# Patient Record
Sex: FEMALE | Race: WHITE | NOT HISPANIC OR LATINO | URBAN - METROPOLITAN AREA
[De-identification: names, ages, dates, MRNs, and addresses within clinical notes are randomized per-mention and may not be internally consistent; named-entity substitution may affect disease eponyms.]

---

## 2017-08-23 ENCOUNTER — OUTPATIENT (OUTPATIENT)
Dept: OUTPATIENT SERVICES | Facility: HOSPITAL | Age: 57
LOS: 1 days | Discharge: HOME | End: 2017-08-23

## 2017-08-23 DIAGNOSIS — R13.10 DYSPHAGIA, UNSPECIFIED: ICD-10-CM

## 2019-04-19 ENCOUNTER — TRANSCRIPTION ENCOUNTER (OUTPATIENT)
Age: 59
End: 2019-04-19

## 2019-07-12 ENCOUNTER — OUTPATIENT (OUTPATIENT)
Dept: OUTPATIENT SERVICES | Facility: HOSPITAL | Age: 59
LOS: 1 days | Discharge: HOME | End: 2019-07-12
Payer: COMMERCIAL

## 2019-07-12 DIAGNOSIS — R10.31 RIGHT LOWER QUADRANT PAIN: ICD-10-CM

## 2019-07-12 DIAGNOSIS — R10.2 PELVIC AND PERINEAL PAIN: ICD-10-CM

## 2019-07-12 DIAGNOSIS — E04.1 NONTOXIC SINGLE THYROID NODULE: ICD-10-CM

## 2019-07-12 PROCEDURE — 76830 TRANSVAGINAL US NON-OB: CPT | Mod: 26

## 2019-07-12 PROCEDURE — 76700 US EXAM ABDOM COMPLETE: CPT | Mod: 26

## 2019-07-12 PROCEDURE — 76856 US EXAM PELVIC COMPLETE: CPT | Mod: 26

## 2019-07-12 PROCEDURE — 76536 US EXAM OF HEAD AND NECK: CPT | Mod: 26

## 2021-01-12 ENCOUNTER — OUTPATIENT (OUTPATIENT)
Dept: OUTPATIENT SERVICES | Facility: HOSPITAL | Age: 61
LOS: 1 days | Discharge: HOME | End: 2021-01-12
Payer: COMMERCIAL

## 2021-01-12 DIAGNOSIS — E04.1 NONTOXIC SINGLE THYROID NODULE: ICD-10-CM

## 2021-01-12 PROCEDURE — 76536 US EXAM OF HEAD AND NECK: CPT | Mod: 26

## 2022-10-02 ENCOUNTER — INPATIENT (INPATIENT)
Facility: HOSPITAL | Age: 62
LOS: 1 days | Discharge: HOME | End: 2022-10-04
Attending: INTERNAL MEDICINE | Admitting: INTERNAL MEDICINE

## 2022-10-02 VITALS
DIASTOLIC BLOOD PRESSURE: 73 MMHG | TEMPERATURE: 98 F | RESPIRATION RATE: 18 BRPM | OXYGEN SATURATION: 99 % | WEIGHT: 240.08 LBS | HEART RATE: 91 BPM | SYSTOLIC BLOOD PRESSURE: 162 MMHG

## 2022-10-02 LAB
ALBUMIN SERPL ELPH-MCNC: 4.4 G/DL — SIGNIFICANT CHANGE UP (ref 3.5–5.2)
ALP SERPL-CCNC: 86 U/L — SIGNIFICANT CHANGE UP (ref 30–115)
ALT FLD-CCNC: 15 U/L — SIGNIFICANT CHANGE UP (ref 0–41)
ANION GAP SERPL CALC-SCNC: 13 MMOL/L — SIGNIFICANT CHANGE UP (ref 7–14)
APPEARANCE UR: CLEAR — SIGNIFICANT CHANGE UP
AST SERPL-CCNC: 22 U/L — SIGNIFICANT CHANGE UP (ref 0–41)
BASOPHILS # BLD AUTO: 0.03 K/UL — SIGNIFICANT CHANGE UP (ref 0–0.2)
BASOPHILS NFR BLD AUTO: 0.3 % — SIGNIFICANT CHANGE UP (ref 0–1)
BILIRUB SERPL-MCNC: 0.6 MG/DL — SIGNIFICANT CHANGE UP (ref 0.2–1.2)
BILIRUB UR-MCNC: NEGATIVE — SIGNIFICANT CHANGE UP
BUN SERPL-MCNC: 17 MG/DL — SIGNIFICANT CHANGE UP (ref 10–20)
CALCIUM SERPL-MCNC: 9.5 MG/DL — SIGNIFICANT CHANGE UP (ref 8.4–10.5)
CHLORIDE SERPL-SCNC: 101 MMOL/L — SIGNIFICANT CHANGE UP (ref 98–110)
CO2 SERPL-SCNC: 27 MMOL/L — SIGNIFICANT CHANGE UP (ref 17–32)
COLOR SPEC: SIGNIFICANT CHANGE UP
CREAT SERPL-MCNC: 0.7 MG/DL — SIGNIFICANT CHANGE UP (ref 0.7–1.5)
DIFF PNL FLD: NEGATIVE — SIGNIFICANT CHANGE UP
EGFR: 97 ML/MIN/1.73M2 — SIGNIFICANT CHANGE UP
EOSINOPHIL # BLD AUTO: 0.03 K/UL — SIGNIFICANT CHANGE UP (ref 0–0.7)
EOSINOPHIL NFR BLD AUTO: 0.3 % — SIGNIFICANT CHANGE UP (ref 0–8)
GLUCOSE SERPL-MCNC: 106 MG/DL — HIGH (ref 70–99)
GLUCOSE UR QL: NEGATIVE — SIGNIFICANT CHANGE UP
HCT VFR BLD CALC: 40.3 % — SIGNIFICANT CHANGE UP (ref 37–47)
HGB BLD-MCNC: 13.7 G/DL — SIGNIFICANT CHANGE UP (ref 12–16)
IMM GRANULOCYTES NFR BLD AUTO: 0.4 % — HIGH (ref 0.1–0.3)
KETONES UR-MCNC: NEGATIVE — SIGNIFICANT CHANGE UP
LEUKOCYTE ESTERASE UR-ACNC: NEGATIVE — SIGNIFICANT CHANGE UP
LIDOCAIN IGE QN: 806 U/L — HIGH (ref 7–60)
LYMPHOCYTES # BLD AUTO: 1.37 K/UL — SIGNIFICANT CHANGE UP (ref 1.2–3.4)
LYMPHOCYTES # BLD AUTO: 13 % — LOW (ref 20.5–51.1)
MCHC RBC-ENTMCNC: 29.4 PG — SIGNIFICANT CHANGE UP (ref 27–31)
MCHC RBC-ENTMCNC: 34 G/DL — SIGNIFICANT CHANGE UP (ref 32–37)
MCV RBC AUTO: 86.5 FL — SIGNIFICANT CHANGE UP (ref 81–99)
MONOCYTES # BLD AUTO: 0.47 K/UL — SIGNIFICANT CHANGE UP (ref 0.1–0.6)
MONOCYTES NFR BLD AUTO: 4.5 % — SIGNIFICANT CHANGE UP (ref 1.7–9.3)
NEUTROPHILS # BLD AUTO: 8.59 K/UL — HIGH (ref 1.4–6.5)
NEUTROPHILS NFR BLD AUTO: 81.5 % — HIGH (ref 42.2–75.2)
NITRITE UR-MCNC: NEGATIVE — SIGNIFICANT CHANGE UP
NRBC # BLD: 0 /100 WBCS — SIGNIFICANT CHANGE UP (ref 0–0)
PH UR: 8 — SIGNIFICANT CHANGE UP (ref 5–8)
PLATELET # BLD AUTO: 212 K/UL — SIGNIFICANT CHANGE UP (ref 130–400)
POTASSIUM SERPL-MCNC: 4.8 MMOL/L — SIGNIFICANT CHANGE UP (ref 3.5–5)
POTASSIUM SERPL-SCNC: 4.8 MMOL/L — SIGNIFICANT CHANGE UP (ref 3.5–5)
PROT SERPL-MCNC: 6.7 G/DL — SIGNIFICANT CHANGE UP (ref 6–8)
PROT UR-MCNC: SIGNIFICANT CHANGE UP
RBC # BLD: 4.66 M/UL — SIGNIFICANT CHANGE UP (ref 4.2–5.4)
RBC # FLD: 14.5 % — SIGNIFICANT CHANGE UP (ref 11.5–14.5)
SARS-COV-2 RNA SPEC QL NAA+PROBE: SIGNIFICANT CHANGE UP
SODIUM SERPL-SCNC: 141 MMOL/L — SIGNIFICANT CHANGE UP (ref 135–146)
SP GR SPEC: 1.01 — SIGNIFICANT CHANGE UP (ref 1.01–1.03)
UROBILINOGEN FLD QL: SIGNIFICANT CHANGE UP
WBC # BLD: 10.53 K/UL — SIGNIFICANT CHANGE UP (ref 4.8–10.8)
WBC # FLD AUTO: 10.53 K/UL — SIGNIFICANT CHANGE UP (ref 4.8–10.8)

## 2022-10-02 PROCEDURE — 74177 CT ABD & PELVIS W/CONTRAST: CPT | Mod: 26,MA

## 2022-10-02 PROCEDURE — 99285 EMERGENCY DEPT VISIT HI MDM: CPT

## 2022-10-02 PROCEDURE — 99223 1ST HOSP IP/OBS HIGH 75: CPT

## 2022-10-02 RX ORDER — SODIUM CHLORIDE 9 MG/ML
1000 INJECTION INTRAMUSCULAR; INTRAVENOUS; SUBCUTANEOUS ONCE
Refills: 0 | Status: COMPLETED | OUTPATIENT
Start: 2022-10-02 | End: 2022-10-02

## 2022-10-02 RX ORDER — FAMOTIDINE 10 MG/ML
20 INJECTION INTRAVENOUS ONCE
Refills: 0 | Status: COMPLETED | OUTPATIENT
Start: 2022-10-02 | End: 2022-10-02

## 2022-10-02 RX ORDER — SODIUM CHLORIDE 9 MG/ML
1000 INJECTION, SOLUTION INTRAVENOUS
Refills: 0 | Status: DISCONTINUED | OUTPATIENT
Start: 2022-10-02 | End: 2022-10-03

## 2022-10-02 RX ORDER — KETOROLAC TROMETHAMINE 30 MG/ML
15 SYRINGE (ML) INJECTION ONCE
Refills: 0 | Status: DISCONTINUED | OUTPATIENT
Start: 2022-10-02 | End: 2022-10-02

## 2022-10-02 RX ADMIN — SODIUM CHLORIDE 2000 MILLILITER(S): 9 INJECTION INTRAMUSCULAR; INTRAVENOUS; SUBCUTANEOUS at 15:12

## 2022-10-02 RX ADMIN — FAMOTIDINE 20 MILLIGRAM(S): 10 INJECTION INTRAVENOUS at 15:12

## 2022-10-02 RX ADMIN — Medication 15 MILLIGRAM(S): at 19:01

## 2022-10-02 NOTE — ED PROVIDER NOTE - ATTENDING APP SHARED VISIT CONTRIBUTION OF CARE
58-year-old female with history of hypertension dyslipidemia here evaluation abdominal pain.  Patient complaining of left-sided abdominal pain since this morning.  She has no fever chills back pain urinary symptoms.  Here on exam patient distress S1-S2 clear auscultation soft with mild lower abdominal tenderness bilaterally.  Neurologic grossly intact.  Impression  Patient here with abdominal pain primarily left-sided with lipase of 806.  CTabd pelvis as well as right quadrant sono pending. 58-year-old female with history of hypertension dyslipidemia, Cholecystectomy here evaluation abdominal pain.  Patient complaining of left-sided abdominal pain since this morning.  She has no fever chills back pain urinary symptoms.  Here on exam patient distress S1-S2 clear auscultation soft with mild lower abdominal tenderness bilaterally.  Neurologic grossly intact.  Impression  Patient here with abdominal pain primarily left-sided with lipase of 806.  CTabd pelvis pending.

## 2022-10-02 NOTE — ED PROVIDER NOTE - NS ED ATTENDING STATEMENT MOD
This was a shared visit with the ISIDORO. I reviewed and verified the documentation and independently performed the documented:

## 2022-10-02 NOTE — ED PROVIDER NOTE - PHYSICAL EXAMINATION
CONSTITUTIONAL: in no apparent distress.   HEAD: Normocephalic; atraumatic.   EYES: Pupils are round and reactive, extra-ocular muscles are intact. Eyelids are normal in appearance without swelling or lesions.   ENT: Hearing is intact with good acuity to spoken voice. Patient is speaking clearly, not muffled and airway is intact.   RESPIRATORY: No signs of respiratory distress. Lung sounds are clear in all lobes bilaterally without rales, rhonchi, or wheezes.  CARDIOVASCULAR: Regular rate and rhythm.   GI: tenderness to palpation in general abd area. Abdomen is soft, and without distention. Bowel sounds are present and normoactive in all four quadrants. No masses are noted.   BACK: No evidence of trauma or deformity. No CVA tenderness bilaterally. Normal ROM.   NEURO: A & O x 3. Normal speech.   PSYCHOLOGICAL: Appropriate mood and affect. Good judgement and insight.

## 2022-10-02 NOTE — ED PROVIDER NOTE - PROGRESS NOTE DETAILS
s/o Dr. Belcher f/u ct and ruq sono imaging s/o Dr. Belcher f/u ct and re-eval CT and lipase level are consistent with pancreatitis. Pt will be admitted for pancreatitis. Pt is aware of the plan and agrees. Spoke with Dr. Zepeda who is aware. Pt has been endorsed to [facility].

## 2022-10-02 NOTE — ED PROVIDER NOTE - NS ED ROS FT
Constitutional: Negative for fever, chills, and fatigue.  HENT: Negative for headache,   Eyes: Negative for eye pain, and vision change.  Cardiovascular: Negative for chest pain, and palpitation.  Respiratory: Negative for SOB, wheezing, cough and sputum production.  Gastrointestinal: + abd pain. Negative for nausea, vomiting, constipation, diarrhea, hematochezia, and melena.  Genitourinary: Negative for flank pain, dysuria, frequency, and hematuria.  Neurological: Negative for dizziness, syncope, and loss of consciousness.  Musculoskeletal: Negative for joint swelling, arthralgias, back pain, neck pain, and calf cramps.  Hematological: Does not bruise/bleed easily.

## 2022-10-02 NOTE — ED PROVIDER NOTE - OBJECTIVE STATEMENT
61-year-old female with past medical history of hypertension and hyperlipidemia who presents with abdominal pain.  Reports that symptoms started this morning; pain is in the left side of his chest, constant, dull pain, and there is no alleviating or worsening factor.  Denies fever, shortness of breath, chest pain, nausea, vomiting, hematuria, dysuria, and change in bowel movement. 61-year-old female with past medical history of hypertension and hyperlipidemia who presents with abdominal pain.  Reports that symptoms started this morning; pain is in the left side of her abdomen, constant, dull pain, and there is no alleviating or worsening factor.  Denies fever, shortness of breath, chest pain, nausea, vomiting, hematuria, dysuria, and change in bowel movement.

## 2022-10-03 LAB
ALBUMIN SERPL ELPH-MCNC: 4.1 G/DL — SIGNIFICANT CHANGE UP (ref 3.5–5.2)
ALP SERPL-CCNC: 86 U/L — SIGNIFICANT CHANGE UP (ref 30–115)
ALT FLD-CCNC: 12 U/L — SIGNIFICANT CHANGE UP (ref 0–41)
ANION GAP SERPL CALC-SCNC: 12 MMOL/L — SIGNIFICANT CHANGE UP (ref 7–14)
AST SERPL-CCNC: 17 U/L — SIGNIFICANT CHANGE UP (ref 0–41)
BILIRUB SERPL-MCNC: 0.8 MG/DL — SIGNIFICANT CHANGE UP (ref 0.2–1.2)
BUN SERPL-MCNC: 14 MG/DL — SIGNIFICANT CHANGE UP (ref 10–20)
CALCIUM SERPL-MCNC: 8.7 MG/DL — SIGNIFICANT CHANGE UP (ref 8.4–10.5)
CHLORIDE SERPL-SCNC: 107 MMOL/L — SIGNIFICANT CHANGE UP (ref 98–110)
CHOLEST SERPL-MCNC: 155 MG/DL — SIGNIFICANT CHANGE UP
CO2 SERPL-SCNC: 25 MMOL/L — SIGNIFICANT CHANGE UP (ref 17–32)
CREAT SERPL-MCNC: 0.8 MG/DL — SIGNIFICANT CHANGE UP (ref 0.7–1.5)
CULTURE RESULTS: SIGNIFICANT CHANGE UP
EGFR: 83 ML/MIN/1.73M2 — SIGNIFICANT CHANGE UP
GLUCOSE SERPL-MCNC: 145 MG/DL — HIGH (ref 70–99)
HCT VFR BLD CALC: 39.7 % — SIGNIFICANT CHANGE UP (ref 37–47)
HCV AB S/CO SERPL IA: 0.04 COI — SIGNIFICANT CHANGE UP
HCV AB SERPL-IMP: SIGNIFICANT CHANGE UP
HDLC SERPL-MCNC: 56 MG/DL — SIGNIFICANT CHANGE UP
HGB BLD-MCNC: 13.5 G/DL — SIGNIFICANT CHANGE UP (ref 12–16)
LIDOCAIN IGE QN: 150 U/L — HIGH (ref 7–60)
LIPID PNL WITH DIRECT LDL SERPL: 74 MG/DL — SIGNIFICANT CHANGE UP
MCHC RBC-ENTMCNC: 29.8 PG — SIGNIFICANT CHANGE UP (ref 27–31)
MCHC RBC-ENTMCNC: 34 G/DL — SIGNIFICANT CHANGE UP (ref 32–37)
MCV RBC AUTO: 87.6 FL — SIGNIFICANT CHANGE UP (ref 81–99)
NON HDL CHOLESTEROL: 99 MG/DL — SIGNIFICANT CHANGE UP
NRBC # BLD: 0 /100 WBCS — SIGNIFICANT CHANGE UP (ref 0–0)
PLATELET # BLD AUTO: 175 K/UL — SIGNIFICANT CHANGE UP (ref 130–400)
POTASSIUM SERPL-MCNC: 4.2 MMOL/L — SIGNIFICANT CHANGE UP (ref 3.5–5)
POTASSIUM SERPL-SCNC: 4.2 MMOL/L — SIGNIFICANT CHANGE UP (ref 3.5–5)
PROT SERPL-MCNC: 6.2 G/DL — SIGNIFICANT CHANGE UP (ref 6–8)
RBC # BLD: 4.53 M/UL — SIGNIFICANT CHANGE UP (ref 4.2–5.4)
RBC # FLD: 14.8 % — HIGH (ref 11.5–14.5)
SODIUM SERPL-SCNC: 144 MMOL/L — SIGNIFICANT CHANGE UP (ref 135–146)
SPECIMEN SOURCE: SIGNIFICANT CHANGE UP
TRIGL SERPL-MCNC: 124 MG/DL — SIGNIFICANT CHANGE UP
TRIGL SERPL-MCNC: 125 MG/DL — SIGNIFICANT CHANGE UP
WBC # BLD: 10.88 K/UL — HIGH (ref 4.8–10.8)
WBC # FLD AUTO: 10.88 K/UL — HIGH (ref 4.8–10.8)

## 2022-10-03 PROCEDURE — 93306 TTE W/DOPPLER COMPLETE: CPT | Mod: 26

## 2022-10-03 PROCEDURE — 76705 ECHO EXAM OF ABDOMEN: CPT | Mod: 26

## 2022-10-03 RX ORDER — LANOLIN ALCOHOL/MO/W.PET/CERES
3 CREAM (GRAM) TOPICAL AT BEDTIME
Refills: 0 | Status: DISCONTINUED | OUTPATIENT
Start: 2022-10-03 | End: 2022-10-04

## 2022-10-03 RX ORDER — ATORVASTATIN CALCIUM 80 MG/1
20 TABLET, FILM COATED ORAL AT BEDTIME
Refills: 0 | Status: DISCONTINUED | OUTPATIENT
Start: 2022-10-03 | End: 2022-10-04

## 2022-10-03 RX ORDER — ACETAMINOPHEN 500 MG
650 TABLET ORAL EVERY 6 HOURS
Refills: 0 | Status: DISCONTINUED | OUTPATIENT
Start: 2022-10-03 | End: 2022-10-04

## 2022-10-03 RX ORDER — KETOROLAC TROMETHAMINE 30 MG/ML
15 SYRINGE (ML) INJECTION EVERY 6 HOURS
Refills: 0 | Status: DISCONTINUED | OUTPATIENT
Start: 2022-10-03 | End: 2022-10-04

## 2022-10-03 RX ORDER — ZOLPIDEM TARTRATE 10 MG/1
5 TABLET ORAL AT BEDTIME
Refills: 0 | Status: DISCONTINUED | OUTPATIENT
Start: 2022-10-03 | End: 2022-10-04

## 2022-10-03 RX ORDER — ENOXAPARIN SODIUM 100 MG/ML
40 INJECTION SUBCUTANEOUS EVERY 24 HOURS
Refills: 0 | Status: DISCONTINUED | OUTPATIENT
Start: 2022-10-03 | End: 2022-10-04

## 2022-10-03 RX ORDER — PHENYLEPHRINE HCL 0.25 %
1 AEROSOL, SPRAY WITH PUMP (ML) NASAL ONCE
Refills: 0 | Status: COMPLETED | OUTPATIENT
Start: 2022-10-03 | End: 2022-10-04

## 2022-10-03 RX ORDER — METOPROLOL TARTRATE 50 MG
50 TABLET ORAL DAILY
Refills: 0 | Status: DISCONTINUED | OUTPATIENT
Start: 2022-10-03 | End: 2022-10-04

## 2022-10-03 RX ORDER — SODIUM CHLORIDE 9 MG/ML
1000 INJECTION, SOLUTION INTRAVENOUS
Refills: 0 | Status: DISCONTINUED | OUTPATIENT
Start: 2022-10-03 | End: 2022-10-04

## 2022-10-03 RX ORDER — INFLUENZA VIRUS VACCINE 15; 15; 15; 15 UG/.5ML; UG/.5ML; UG/.5ML; UG/.5ML
0.5 SUSPENSION INTRAMUSCULAR ONCE
Refills: 0 | Status: DISCONTINUED | OUTPATIENT
Start: 2022-10-03 | End: 2022-10-04

## 2022-10-03 RX ORDER — FUROSEMIDE 40 MG
40 TABLET ORAL DAILY
Refills: 0 | Status: DISCONTINUED | OUTPATIENT
Start: 2022-10-03 | End: 2022-10-04

## 2022-10-03 RX ORDER — TRAMADOL HYDROCHLORIDE 50 MG/1
50 TABLET ORAL EVERY 6 HOURS
Refills: 0 | Status: DISCONTINUED | OUTPATIENT
Start: 2022-10-03 | End: 2022-10-04

## 2022-10-03 RX ORDER — SPIRONOLACTONE 25 MG/1
25 TABLET, FILM COATED ORAL DAILY
Refills: 0 | Status: DISCONTINUED | OUTPATIENT
Start: 2022-10-03 | End: 2022-10-04

## 2022-10-03 RX ORDER — ONDANSETRON 8 MG/1
4 TABLET, FILM COATED ORAL EVERY 8 HOURS
Refills: 0 | Status: DISCONTINUED | OUTPATIENT
Start: 2022-10-03 | End: 2022-10-04

## 2022-10-03 RX ORDER — FAMOTIDINE 10 MG/ML
40 INJECTION INTRAVENOUS
Refills: 0 | Status: DISCONTINUED | OUTPATIENT
Start: 2022-10-03 | End: 2022-10-04

## 2022-10-03 RX ADMIN — FAMOTIDINE 40 MILLIGRAM(S): 10 INJECTION INTRAVENOUS at 21:04

## 2022-10-03 RX ADMIN — SODIUM CHLORIDE 250 MILLILITER(S): 9 INJECTION, SOLUTION INTRAVENOUS at 05:29

## 2022-10-03 RX ADMIN — FAMOTIDINE 40 MILLIGRAM(S): 10 INJECTION INTRAVENOUS at 05:28

## 2022-10-03 RX ADMIN — SODIUM CHLORIDE 150 MILLILITER(S): 9 INJECTION, SOLUTION INTRAVENOUS at 01:48

## 2022-10-03 RX ADMIN — Medication 40 MILLIGRAM(S): at 05:25

## 2022-10-03 RX ADMIN — Medication 50 MILLIGRAM(S): at 05:28

## 2022-10-03 RX ADMIN — ENOXAPARIN SODIUM 40 MILLIGRAM(S): 100 INJECTION SUBCUTANEOUS at 18:06

## 2022-10-03 RX ADMIN — SODIUM CHLORIDE 150 MILLILITER(S): 9 INJECTION, SOLUTION INTRAVENOUS at 18:31

## 2022-10-03 RX ADMIN — SODIUM CHLORIDE 150 MILLILITER(S): 9 INJECTION, SOLUTION INTRAVENOUS at 21:05

## 2022-10-03 RX ADMIN — SPIRONOLACTONE 25 MILLIGRAM(S): 25 TABLET, FILM COATED ORAL at 05:25

## 2022-10-03 RX ADMIN — ATORVASTATIN CALCIUM 20 MILLIGRAM(S): 80 TABLET, FILM COATED ORAL at 21:03

## 2022-10-03 NOTE — PROGRESS NOTE ADULT - SUBJECTIVE AND OBJECTIVE BOX
Chart reviewed, patient examined. Pertinent results reviewed.  Case discussed with HO; specialist f/u reviewed  HD#1    Patient is a 61y old  Female who presents with a chief complaint of lower abdominal pain, and evaluation revealed she has Acute Pancreatitis.    Admitted on: 10-02-22- yesterday      HPI:  61-year-old female  with pmh of Hypertension and hyperlipidemia , ovarian cancer s/p BL oophorectomy in 2013. cholecystectomy in 2001, Presents with abdominal pain.  Patient reports yesterday morning she started having mid abd pain radiating to lower abdomen and to epigastric area. Pain was not related to food, and she only drinks alcohol on rare occasions. there was no nausea, vomiting, diarrhea, constipation, wt loss, fever, or any other symptoms. Pain was stabbing in nature and requiring tramadol for relief. to note that she had cholecystectomy many years ago and never had pancreatitis in the past. no FHx of pancreatic disease. non smoker. her home meds include a loop diuretic which she has been on chronically.    In the ED, she was HD stable. labs showed leukocytosis 11K and Lipase :806. LFTs and TG level were normal. CT abdomen/pelvis showed Inflammatory changes surrounding the tail of pancreas compatible with acute pancreatitis. US abd showed CBD 6 mm and s/p cholecystectomy. she is on LR at 150/hr and is kept NPO for now.          Prior EGD: done outside with Dr. Preciado    Prior Colonoscopy: done outside with Dr. Preciado      PAST MEDICAL & SURGICAL HISTORY:  as above        FAMILY HISTORY: as noted      Social History: ; retired   Tobacco: none  Alcohol: rare  Drugs: none    Home Medications:  atorvastatin 20 mg oral tablet: 1 tab(s) orally once a day (03 Oct 2022 01:26)  famotidine 40 mg oral tablet: 1 tab(s) orally 2 times a day (03 Oct 2022 01:27)  furosemide 40 mg oral tablet: 1 tab(s) orally once a day (03 Oct 2022 01:28)  metoprolol succinate 50 mg oral tablet, extended release: 1 tab(s) orally once a day (03 Oct 2022 01:28)  spironolactone 25 mg oral tablet: 1 tab(s) orally once a day (03 Oct 2022 01:29)  traMADol 50 mg oral tablet: 1 tab(s) orally every 6 hours, As Needed (03 Oct 2022 01:28)  zolpidem 5 mg oral tablet: 1 tab(s) orally once a day (at bedtime), As Needed (03 Oct 2022 01:28)        MEDICATIONS  (STANDING):  atorvastatin 20 milliGRAM(s) Oral at bedtime  enoxaparin Injectable 40 milliGRAM(s) SubCutaneous every 24 hours  famotidine    Tablet 40 milliGRAM(s) Oral two times a day  furosemide    Tablet 40 milliGRAM(s) Oral daily  influenza   Vaccine 0.5 milliLiter(s) IntraMuscular once  lactated ringers. 1000 milliLiter(s) (150 mL/Hr) IV Continuous <Continuous>  metoprolol succinate ER 50 milliGRAM(s) Oral daily  spironolactone 25 milliGRAM(s) Oral daily    MEDICATIONS  (PRN):  acetaminophen     Tablet .. 650 milliGRAM(s) Oral every 6 hours PRN Temp greater or equal to 38C (100.4F), Mild Pain (1 - 3)  aluminum hydroxide/magnesium hydroxide/simethicone Suspension 30 milliLiter(s) Oral every 4 hours PRN Dyspepsia  ketorolac   Injectable 15 milliGRAM(s) IV Push every 6 hours PRN Moderate Pain (4 - 6)  melatonin 3 milliGRAM(s) Oral at bedtime PRN Insomnia  ondansetron Injectable 4 milliGRAM(s) IV Push every 8 hours PRN Nausea and/or Vomiting  traMADol 50 milliGRAM(s) Oral every 6 hours PRN Moderate Pain (4 - 6)  zolpidem 5 milliGRAM(s) Oral at bedtime PRN Insomnia      Allergies  codeine (Unknown)      Review of Systems: as per HPI  Constitutional:  No Fever, No Chills  ENT/Mouth:  No Hearing Changes,  No Difficulty Swallowing  Eyes:  No Eye Pain, No Vision Changes  Cardiovascular:  No Chest Pain, No Palpitations  Respiratory:  No Cough, No Dyspnea  Gastrointestinal:  As described in HPI  Musculoskeletal:  No Joint Swelling, No Back Pain  Skin:  No Skin Lesions, No Jaundice  Neuro:  No Syncope, No Dizziness  Heme/Lymph:  No Bruising, No Bleeding.          Physical Examination:  T(C): 36.1 (10-03-22 @ 05:00), Max: 36.8 (10-02-22 @ 13:37)  HR: 70 (10-03-22 @ 05:00) (70 - 91)  BP: 118/60 (10-03-22 @ 05:00) (118/60 - 162/73)  RR: 18 (10-03-22 @ 05:00) (18 - 18)  SpO2: 98% (10-02-22 @ 21:31) (98% - 99%)  Height (cm): 162.6 (10-03-22 @ 01:30)  Weight (kg): 108.9 (10-02-22 @ 13:37)        GENERAL:  Appears stated age, well-groomed, well-nourished, no distress; Obese  HEENT:  NC/AT,  conjunctivae clear and pink, no thyromegaly, nodules, adenopathy, sclera -anicteric  CHEST:  Full & symmetric excursion, no increased effort, lungs clear  HEART:  RRR, no MRG   ABDOMEN:  very obese; scars- RUQ , & long vertical Midline; Soft, epigastric and mid abd tenderness. no rebound or signs of peritonitis; no HSM, M  EXTEREMITIES:  no cyanosis, clubbing; + edema +1 B/L ankle and feet  SKIN:  No rash/erythema/ecchymoses/petechiae/wounds/abscess/warm/dry  NEURO:  Alert, oriented, no asterixis, no tremor, no encephalopathy          Data:                        13.5   10.88 )-----------( 175      ( 03 Oct 2022 11:07 )             39.7     Hgb Trend:  13.5  10-03-22 @ 11:07  13.7  10-02-22 @ 15:50      10-03    144  |  107  |  14  ----------------------------<  145<H>  4.2   |  25  |  0.8    Ca    8.7      03 Oct 2022 11:07    TPro  6.2  /  Alb  4.1  /  TBili  0.8  /  DBili  x   /  AST  17  /  ALT  12  /  AlkPhos  86  10-03    Liver panel trend:  TBili 0.8   /   AST 17   /   ALT 12   /   AlkP 86   /   Tptn 6.2   /   Alb 4.1    /   DBili --      10-03  TBili 0.6   /   AST 22   /   ALT 15   /   AlkP 86   /   Tptn 6.7   /   Alb 4.4    /   DBili --      10-02              Radiology:  CT Abdomen and Pelvis w/ IV Cont:   ACC: [id number] EXAM:  CT ABDOMEN AND PELVIS IC                          PROCEDURE DATE:  10/02/2022          INTERPRETATION:  CLINICAL STATEMENT: Abdominal pain. LLQ abd pain    TECHNIQUE: Contiguous axial CT images were obtained from the lower chest   to the pubic symphysis following administration of intravenous contrast.    Oral contrast was not administered. Reformatted images in the coronal and   sagittal planes were acquired.    COMPARISON CT: None    FINDINGS:    LOWER CHEST: Bibasilar subsegmental atelectasis.    HEPATOBILIARY: Patient is post cholecystectomy.    SPLEEN: Within normal limits.    ADRENALS: Within normal limits.    PANCREAS: Inflammatory changes surrounding the tail of pancreas    KIDNEYS: Symmetric renal enhancement. No hydronephrosis. Left renal cyst   and additional hypodensities too small to characterize.    ABDOMINOPELVIC NODES: No enlarged abdominal or pelvic lymph nodes.    PELVIC ORGANS: Uterus not visualized; correlate for hysterectomy..    PERITONEUM/MESENTERY/BOWEL: No bowel obstruction, ascites or free   intraperitoneal air. The appendix is unremarkable. Postsurgical changes   of the colon.    BONES/SOFT TISSUES: No acute osseous abnormality. Sclerotic foci within   the osseous structures, nonspecific      IMPRESSION:  Inflammatory changes surrounding the tail of pancreas compatible with   acute pancreatitis    Nonspecific sclerotic foci within the osseous structures.    --- End of Report ---            CHEVY MAZARIEGOS MD; Attending Radiologist  This document has been electronically signed. Oct  2 2022  8:52PM (10-02-22 @ 17:54)    US Abdomen Upper Quadrant Right:   ACC: [id number] EXAM:  US ABDOMEN RT UPR QUADRANT                          PROCEDURE DATE:  10/03/2022          INTERPRETATION:  CLINICAL INFORMATION: Abdominal pain, pancreatitis    COMPARISON: CT abdomen and pelvis 10/2/2022    TECHNIQUE: Sonography of the right upper quadrant.    FINDINGS:  Liver: Limited evaluation by sonography  Bile ducts: Normal caliber. Common bile duct measures 6 mm.  Gallbladder: Status post cholecystectomy.  Pancreas: Limited evaluation of the pancreas by sonography.  Right kidney: Partially obscured and the parenchyma is not well   evaluated. 10.1 cm. No hydronephrosis.  Ascites: None.    IMPRESSION:    Limited evaluation of the pancreas by sonography -see prior CT    Status post cholecystectomy.    --- End of Report ---          STEPHIE SARAH MD; Resident Radiologist  This document has been electronically signed.  GÉNESIS GARCIA MD; Attending Radiologist  This document has been electronically signed. Oct  3 2022  9:07AM (10-03-22 @ 07:57)

## 2022-10-03 NOTE — CONSULT NOTE ADULT - ASSESSMENT
61-year-old female  with pmh of Hypertension and hyperlipidemia , ovarian cancer s/p BL oophorectomy in 2013. cholecystectomy in 2001, Presents with abdominal pain.  Patient reports yesterday morning she started having mid abd pain radiating to lower abdomen and to epigastric area. Pain was not related to food, and she only drinks alcohol on rare occasions. there was no nausea, vomiting, diarrhea, constipation, wt loss, fever, or any other symptoms. Pain was stabbing in nature and requiring tramadol for relief. to note that she had cholecystectomy many years ago and never had pancreatitis in the past. no FHx of pancreatic disease. non smoker. her home meds include a loop diuretic which she has been on chronically.  In the ED, she was HD stable. labs showed leukocytosis 11K and Lipase :806. LFTs and TG level were normal. CT abdomen/pelvis showed Inflammatory changes surrounding the tail of pancreas compatible with acute pancreatitis. US abd showed CBD 6 mm and s/p cholecystectomy. she is on LR at 150/hr and is kept NPO for now.    # Acute interstitial Pancreatitis - mild/moderate - r/o passed CBD stone vs medication induced vs pancreatic ductal pathology  - abd pain present  - elevated lipase and CT abd showing Inflammatory changes surrounding the tail of pancreas compatible with acute pancreatitis.  - TG level normal  - s/p cholecystectomy. LFTs normal  - rare alcohol intake    Plan  - continue IV fluids /hr  - Patient tolerated breakfast this morning. start low fat diet and reevaluate  - Patient will need EUS or MRCP as outpatient    Incomplete note. awaiting discussion with attending     61-year-old female  with pmh of Hypertension and hyperlipidemia , ovarian cancer s/p BL oophorectomy in 2013. cholecystectomy in 2001, Presents with abdominal pain.  Patient reports yesterday morning she started having mid abd pain radiating to lower abdomen and to epigastric area. Pain was not related to food, and she only drinks alcohol on rare occasions. there was no nausea, vomiting, diarrhea, constipation, wt loss, fever, or any other symptoms. Pain was stabbing in nature and requiring tramadol for relief. to note that she had cholecystectomy many years ago and never had pancreatitis in the past. no FHx of pancreatic disease. non smoker. her home meds include a loop diuretic which she has been on chronically.  In the ED, she was HD stable. labs showed leukocytosis 11K and Lipase :806. LFTs and TG level were normal. CT abdomen/pelvis showed Inflammatory changes surrounding the tail of pancreas compatible with acute pancreatitis. US abd showed CBD 6 mm and s/p cholecystectomy. she is on LR at 150/hr and is kept NPO for now.    # Acute interstitial Pancreatitis - mild/moderate - r/o passed CBD stone vs medication induced vs pancreatic ductal pathology  - abd pain present  - elevated lipase and CT abd showing Inflammatory changes surrounding the tail of pancreas compatible with acute pancreatitis.  - TG level normal  - s/p cholecystectomy. LFTs normal  - rare alcohol intake    Plan  - continue IV fluids  - Patient tolerated breakfast this morning. start clear liquid diet and advance tomorrow if tolerated  - Patient will need EUS as outpatient  - check IGG4 level  - consider switching lasix to different diuretic if possible    Plan discussed with attending physician     61-year-old female  with pmh of Hypertension and hyperlipidemia , ovarian cancer s/p BL oophorectomy in 2013. cholecystectomy in 2001, Presents with abdominal pain.  Patient reports yesterday morning she started having mid abd pain radiating to lower abdomen and to epigastric area. Pain was not related to food, and she only drinks alcohol on rare occasions. there was no nausea, vomiting, diarrhea, constipation, wt loss, fever, or any other symptoms. Pain was stabbing in nature and requiring tramadol for relief. to note that she had cholecystectomy many years ago and never had pancreatitis in the past. no FHx of pancreatic disease. non smoker. her home meds include a loop diuretic which she has been on chronically.  In the ED, she was HD stable. labs showed leukocytosis 11K and Lipase :806. LFTs and TG level were normal. CT abdomen/pelvis showed Inflammatory changes surrounding the tail of pancreas compatible with acute pancreatitis. US abd showed CBD 6 mm and s/p cholecystectomy. she is on LR at 150/hr and is kept NPO for now.    # Acute interstitial Pancreatitis - r/o passed CBD stone vs medication induced vs pancreatic ductal pathology  - abd pain present  - elevated lipase and CT abd showing Inflammatory changes surrounding the tail of pancreas compatible with acute pancreatitis.  - TG level normal  - s/p cholecystectomy. LFTs normal  - rare alcohol intake    Plan  - continue IV fluids  - Patient tolerated breakfast this morning,  start clear liquid diet and advance tomorrow to low fat diet  if tolerated  - Patient will need EUS as outpatient  - check IGG4 level  - consider switching Lasix to different diuretic if possible    Plan discussed with attending physician

## 2022-10-03 NOTE — H&P ADULT - NSHPPHYSICALEXAM_GEN_ALL_CORE
GENERAL: NAD, lying in bed comfortably, sleeping   HEAD:  Atraumatic, Normocephalic  EYES: EOMI, conjunctiva and sclera clear  ENT: Moist mucous membranes  NECK: Supple  CHEST/LUNG: Clear to auscultation bilaterally;   HEART: Regular rate and rhythm;  ABDOMEN: Soft,+ tender to deep palp epigastric area, mildly distended   EXTREMITIES:  3+ LE edema   NERVOUS SYSTEM:  Alert & Oriented X3, speech clear.   SKIN: No rashes or lesions

## 2022-10-03 NOTE — PATIENT PROFILE ADULT - FALL HARM RISK - UNIVERSAL INTERVENTIONS
No
Bed in lowest position, wheels locked, appropriate side rails in place/Call bell, personal items and telephone in reach/Instruct patient to call for assistance before getting out of bed or chair/Non-slip footwear when patient is out of bed/[employer] to call system/Physically safe environment - no spills, clutter or unnecessary equipment/Purposeful Proactive Rounding/Room/bathroom lighting operational, light cord in reach

## 2022-10-03 NOTE — H&P ADULT - NSHPREVIEWOFSYSTEMS_GEN_ALL_CORE
GENERAL: NAD, lying in bed comfortably sleeping   HEAD:  Atraumatic, Normocephalic  EYES: EOMI, conjunctiva and sclera clear  ENT: Moist mucous membranes  NECK: Supple,  CHEST/LUNG: Clear to auscultation bilaterally  HEART: Regular rate and rhythm  ABDOMEN:  Soft, tender to deep palp, mild distention   EXTREMITIES:  LE edema 3+  NERVOUS SYSTEM:  Alert & Oriented X3, speech clear.   MSK: FROM all 4 extremities, full and equal strength

## 2022-10-03 NOTE — H&P ADULT - NSHPLABSRESULTS_GEN_ALL_CORE
(10-02 @ 15:50)                      13.7  10.53 )-----------( 212                 40.3    Neutrophils = 8.59 (81.5%)  Lymphocytes = 1.37 (13.0%)  Eosinophils = 0.03 (0.3%)  Basophils = 0.03 (0.3%)  Monocytes = 0.47 (4.5%)  Bands = --%    10-02    141  |  101  |  17  ----------------------------<  106<H>  4.8   |  27  |  0.7    Ca    9.5      02 Oct 2022 15:50    TPro  6.7  /  Alb  4.4  /  TBili  0.6  /  DBili  x   /  AST  22  /  ALT  15  /  AlkPhos  86  10-02        Urinalysis Basic - ( 02 Oct 2022 16:03 )    Color: Light Yellow / Appearance: Clear / S.013 / pH: x  Gluc: x / Ketone: Negative  / Bili: Negative / Urobili: <2 mg/dL   Blood: x / Protein: Trace / Nitrite: Negative   Leuk Esterase: Negative / RBC: x / WBC x   Sq Epi: x / Non Sq Epi: x / Bacteria: x

## 2022-10-03 NOTE — H&P ADULT - ASSESSMENT
61-year-old female   PMH: Hypertension and hyperlipidemia   Presents with abdominal pain.      # Pancreatitis   - Lipase :806  - CT abdomen/pelvis l Inflammatory changes surrounding the tail of pancreas compatible with acute pancreatitis  Nonspecific sclerotic foci within the osseous structures.    # HT     # HLD     # DVT : lovenox   # Diet : DASH - low fat   # Activity : As colette   # Dispo : form home      61-year-old female   PMH: Hypertension and hyperlipidemia   Presents with abdominal pain.      # Pancreatitis   - Lipase :806  - CT abdomen/pelvis l Inflammatory changes surrounding the tail of pancreas compatible with acute pancreatitis  Nonspecific sclerotic foci within the osseous structures.  - CCE in 2001  - f/u US abdomen   - LR at 250/h  - Ketoralax 15 IV Q6 PRN  - Zofran 4mg IV Q8  - Early refeeding low fat diet     # LE Edema   - f/u TTE   - c/w home lasix   - holding home spironolactone     # HT   - c/w home metoprolol     # HLD   - c/w home statin     # Insomnia   # chronic pain  - c/w home tramadol PRN  - c/w home zolpidem PRN    # DVT : lovenox   # Diet : DASH - low fat   # Activity : As colette   # Dispo : from home      61-year-old female   PMH: Hypertension and hyperlipidemia   Presents with abdominal pain.      # Pancreatitis   - Lipase :806  - CT abdomen/pelvis l Inflammatory changes surrounding the tail of pancreas compatible with acute pancreatitis  Nonspecific sclerotic foci within the osseous structures.  - CCE in 2001  - f/u US abdomen   - LR at 250/h  - Ketoralax 15 IV Q6 PRN  - Zofran 4mg IV Q8  - Early refeeding low fat diet     # LE Edema   - f/u TTE   - c/w home lasix po  - holding home spironolactone   - LE elevation     # HT   - c/w home metoprolol     # HLD   - c/w home statin     # Insomnia   # chronic pain  - c/w home tramadol PRN  - c/w home zolpidem PRN    # DVT : lovenox   # Diet : DASH - low fat   # Activity : As colette   # Dispo : from home

## 2022-10-03 NOTE — PROGRESS NOTE ADULT - ASSESSMENT
61-year-old female  with pmh of Hypertension and hyperlipidemia , ovarian cancer s/p BL oophorectomy in 2013. cholecystectomy in 2001, Presents with abdominal pain.  Patient reports yesterday morning she started having mid abd pain radiating to lower abdomen and to epigastric area. Pain was not related to food, and she only drinks alcohol on rare occasions. there was no nausea, vomiting, diarrhea, constipation, wt loss, fever, or any other symptoms. Pain was stabbing in nature and requiring tramadol for relief. to note that she had cholecystectomy many years ago and never had pancreatitis in the past. no FHx of pancreatic disease. non smoker. her home meds include a loop diuretic which she has been on chronically.  In the ED, she was HD stable. labs showed leukocytosis 11K and Lipase :806. LFTs and TG level were normal. CT abdomen/pelvis showed Inflammatory changes surrounding the tail of pancreas compatible with acute pancreatitis. US abd showed CBD 6 mm and s/p cholecystectomy. she is on LR at 150/hr and is kept NPO for now.    # Acute interstitial Pancreatitis - r/o passed CBD stone vs medication induced vs pancreatic ductal pathology  - abd pain present  - elevated lipase and CT abd showing Inflammatory changes surrounding the tail of pancreas compatible with acute pancreatitis.  - TG level normal  - s/p cholecystectomy. LFTs normal  - rare alcohol intake    Plan as per GI"  - continue IV fluids  - Patient tolerated breakfast this morning,  start clear liquid diet and advance tomorrow to low fat diet  if tolerated  - Patient will need EUS as outpatient  - check IGG4 level  - recommended switching Lasix to different diuretic if possible; would DC Lasix for now- as possible cause    Ovarian CA- NELLY; follows up at [facility];  will follow recommendations;   see H&P; continue home meds;

## 2022-10-03 NOTE — H&P ADULT - HISTORY OF PRESENT ILLNESS
61-year-old female     PMH: Hypertension and hyperlipidemia , ovacian cancer s/p BL oophorectomy in 2013. CCE 2001    Presents with abdominal pain.      Reports that symptoms started this morning; pain is in the left side of her abdomen, constant, dull pain, and there is no alleviating or worsening factor.    Denies fever, shortness of breath, chest pain, nausea, vomiting, hematuria, dysuria, and change in bowel movement    In the ED:  Bp:162/73  HR: 91   T : 98  O2 : 99 on RA     Lipase :806  CT abdomen/pelvis l Inflammatory changes surrounding the tail of pancreas compatible with acute pancreatitis  Nonspecific sclerotic foci within the osseous structures.

## 2022-10-03 NOTE — CONSULT NOTE ADULT - SUBJECTIVE AND OBJECTIVE BOX
Gastroenterology Consultation:    Patient is a 61y old  Female who presents with a chief complaint of Pancreatitis (03 Oct 2022 00:48)        Admitted on: 10-02-22      HPI:  61-year-old female  with pmh of Hypertension and hyperlipidemia , ovarian cancer s/p BL oophorectomy in 2013. cholecystectomy in 2001, Presents with abdominal pain.  Patient reports yesterday morning she started having mid abd pain radiating to lower abdomen and to epigastric area. Pain was not related to food, and she only drinks alcohol on rare occasions. there was no nausea, vomiting, diarrhea, constipation, wt loss, fever, or any other symptoms. Pain was stabbing in nature and requiring tramadol for relief. to note that she had cholecystectomy many years ago and never had pancreatitis in the past. no FHx of pancreatic disease. non smoker. her home meds include a loop diuretic which she has been on chronically.    In the ED, she was HD stable. labs showed leukocytosis 11K and Lipase :806. LFTs and TG level were normal. CT abdomen/pelvis showed Inflammatory changes surrounding the tail of pancreas compatible with acute pancreatitis. US abd showed CBD 6 mm and s/p cholecystectomy. she is on LR at 150/hr and is kept NPO for now.          Prior EGD: done outside with Dr. Parmer    Prior Colonoscopy: done outside with Dr. Parmer      PAST MEDICAL & SURGICAL HISTORY:        FAMILY HISTORY:      Social History:  Tobacco: none  Alcohol: rare  Drugs:    Home Medications:  atorvastatin 20 mg oral tablet: 1 tab(s) orally once a day (03 Oct 2022 01:26)  famotidine 40 mg oral tablet: 1 tab(s) orally 2 times a day (03 Oct 2022 01:27)  furosemide 40 mg oral tablet: 1 tab(s) orally once a day (03 Oct 2022 01:28)  metoprolol succinate 50 mg oral tablet, extended release: 1 tab(s) orally once a day (03 Oct 2022 01:28)  spironolactone 25 mg oral tablet: 1 tab(s) orally once a day (03 Oct 2022 01:29)  traMADol 50 mg oral tablet: 1 tab(s) orally every 6 hours, As Needed (03 Oct 2022 01:28)  zolpidem 5 mg oral tablet: 1 tab(s) orally once a day (at bedtime), As Needed (03 Oct 2022 01:28)        MEDICATIONS  (STANDING):  atorvastatin 20 milliGRAM(s) Oral at bedtime  enoxaparin Injectable 40 milliGRAM(s) SubCutaneous every 24 hours  famotidine    Tablet 40 milliGRAM(s) Oral two times a day  furosemide    Tablet 40 milliGRAM(s) Oral daily  influenza   Vaccine 0.5 milliLiter(s) IntraMuscular once  lactated ringers. 1000 milliLiter(s) (150 mL/Hr) IV Continuous <Continuous>  metoprolol succinate ER 50 milliGRAM(s) Oral daily  spironolactone 25 milliGRAM(s) Oral daily    MEDICATIONS  (PRN):  acetaminophen     Tablet .. 650 milliGRAM(s) Oral every 6 hours PRN Temp greater or equal to 38C (100.4F), Mild Pain (1 - 3)  aluminum hydroxide/magnesium hydroxide/simethicone Suspension 30 milliLiter(s) Oral every 4 hours PRN Dyspepsia  ketorolac   Injectable 15 milliGRAM(s) IV Push every 6 hours PRN Moderate Pain (4 - 6)  melatonin 3 milliGRAM(s) Oral at bedtime PRN Insomnia  ondansetron Injectable 4 milliGRAM(s) IV Push every 8 hours PRN Nausea and/or Vomiting  traMADol 50 milliGRAM(s) Oral every 6 hours PRN Moderate Pain (4 - 6)  zolpidem 5 milliGRAM(s) Oral at bedtime PRN Insomnia      Allergies  codeine (Unknown)      Review of Systems:   Constitutional:  No Fever, No Chills  ENT/Mouth:  No Hearing Changes,  No Difficulty Swallowing  Eyes:  No Eye Pain, No Vision Changes  Cardiovascular:  No Chest Pain, No Palpitations  Respiratory:  No Cough, No Dyspnea  Gastrointestinal:  As described in HPI  Musculoskeletal:  No Joint Swelling, No Back Pain  Skin:  No Skin Lesions, No Jaundice  Neuro:  No Syncope, No Dizziness  Heme/Lymph:  No Bruising, No Bleeding.          Physical Examination:  T(C): 36.1 (10-03-22 @ 05:00), Max: 36.8 (10-02-22 @ 13:37)  HR: 70 (10-03-22 @ 05:00) (70 - 91)  BP: 118/60 (10-03-22 @ 05:00) (118/60 - 162/73)  RR: 18 (10-03-22 @ 05:00) (18 - 18)  SpO2: 98% (10-02-22 @ 21:31) (98% - 99%)  Height (cm): 162.6 (10-03-22 @ 01:30)  Weight (kg): 108.9 (10-02-22 @ 13:37)        GENERAL:  Appears stated age, well-groomed, well-nourished, no distress  HEENT:  NC/AT,  conjunctivae clear and pink, no thyromegaly, nodules, adenopathy, sclera -anicteric  CHEST:  Full & symmetric excursion, no increased effort, breath sounds clear  HEART:  Regular rhythm, S1, S2,   ABDOMEN:  Soft, epigastric and mid abd tenderness. no rebound or signs of peritonitis  EXTEREMITIES:  no cyanosis,clubbing or edema  SKIN:  No rash/erythema/ecchymoses/petechiae/wounds/abscess/warm/dry  NEURO:  Alert, oriented, no asterixis, no tremor, no encephalopathy          Data:                        13.5   10.88 )-----------( 175      ( 03 Oct 2022 11:07 )             39.7     Hgb Trend:  13.5  10-03-22 @ 11:07  13.7  10-02-22 @ 15:50      10-03    144  |  107  |  14  ----------------------------<  145<H>  4.2   |  25  |  0.8    Ca    8.7      03 Oct 2022 11:07    TPro  6.2  /  Alb  4.1  /  TBili  0.8  /  DBili  x   /  AST  17  /  ALT  12  /  AlkPhos  86  10-03    Liver panel trend:  TBili 0.8   /   AST 17   /   ALT 12   /   AlkP 86   /   Tptn 6.2   /   Alb 4.1    /   DBili --      10-03  TBili 0.6   /   AST 22   /   ALT 15   /   AlkP 86   /   Tptn 6.7   /   Alb 4.4    /   DBili --      10-02              Radiology:  CT Abdomen and Pelvis w/ IV Cont:   ACC: [id number] EXAM:  CT ABDOMEN AND PELVIS IC                          PROCEDURE DATE:  10/02/2022          INTERPRETATION:  CLINICAL STATEMENT: Abdominal pain. LLQ abd pain    TECHNIQUE: Contiguous axial CT images were obtained from the lower chest   to the pubic symphysis following administration of intravenous contrast.    Oral contrast was not administered. Reformatted images in the coronal and   sagittal planes were acquired.    COMPARISON CT: None    FINDINGS:    LOWER CHEST: Bibasilar subsegmental atelectasis.    HEPATOBILIARY: Patient is post cholecystectomy.    SPLEEN: Within normal limits.    ADRENALS: Within normal limits.    PANCREAS: Inflammatory changes surrounding the tail of pancreas    KIDNEYS: Symmetric renal enhancement. No hydronephrosis. Left renal cyst   and additional hypodensities too small to characterize.    ABDOMINOPELVIC NODES: No enlarged abdominal or pelvic lymph nodes.    PELVIC ORGANS: Uterus not visualized; correlate for hysterectomy..    PERITONEUM/MESENTERY/BOWEL: No bowel obstruction, ascites or free   intraperitoneal air. The appendix is unremarkable. Postsurgical changes   of the colon.    BONES/SOFT TISSUES: No acute osseous abnormality. Sclerotic foci within   the osseous structures, nonspecific      IMPRESSION:  Inflammatory changes surrounding the tail of pancreas compatible with   acute pancreatitis    Nonspecific sclerotic foci within the osseous structures.    --- End of Report ---            CHEVY MAZARIEGOS MD; Attending Radiologist  This document has been electronically signed. Oct  2 2022  8:52PM (10-02-22 @ 17:54)    US Abdomen Upper Quadrant Right:   ACC: [id number] EXAM:  US ABDOMEN RT UPR QUADRANT                          PROCEDURE DATE:  10/03/2022          INTERPRETATION:  CLINICAL INFORMATION: Abdominal pain, pancreatitis    COMPARISON: CT abdomen and pelvis 10/2/2022    TECHNIQUE: Sonography of the right upper quadrant.    FINDINGS:  Liver: Limited evaluation by sonography  Bile ducts: Normal caliber. Common bile duct measures 6 mm.  Gallbladder: Status post cholecystectomy.  Pancreas: Limited evaluation of the pancreas by sonography.  Right kidney: Partially obscured and the parenchyma is not well   evaluated. 10.1 cm. No hydronephrosis.  Ascites: None.    IMPRESSION:    Limited evaluation of the pancreas by sonography -see prior CT    Status post cholecystectomy.    --- End of Report ---          STEPHIE SARAH MD; Resident Radiologist  This document has been electronically signed.  GÉNESIS GARCIA MD; Attending Radiologist  This document has been electronically signed. Oct  3 2022  9:07AM (10-03-22 @ 07:57)

## 2022-10-03 NOTE — PATIENT PROFILE ADULT - FUNCTIONAL SCREEN CURRENT LEVEL: COMMUNICATION, MLM
Per PAO Bills, patient is already scheduled with Dr. Hanks on 11-9-2020.  
0 = understands/communicates without difficulty

## 2022-10-04 ENCOUNTER — TRANSCRIPTION ENCOUNTER (OUTPATIENT)
Age: 62
End: 2022-10-04

## 2022-10-04 VITALS
HEART RATE: 84 BPM | SYSTOLIC BLOOD PRESSURE: 113 MMHG | OXYGEN SATURATION: 96 % | RESPIRATION RATE: 18 BRPM | DIASTOLIC BLOOD PRESSURE: 66 MMHG

## 2022-10-04 LAB
ALBUMIN SERPL ELPH-MCNC: 3.9 G/DL — SIGNIFICANT CHANGE UP (ref 3.5–5.2)
ALP SERPL-CCNC: 79 U/L — SIGNIFICANT CHANGE UP (ref 30–115)
ALT FLD-CCNC: 12 U/L — SIGNIFICANT CHANGE UP (ref 0–41)
ANION GAP SERPL CALC-SCNC: 11 MMOL/L — SIGNIFICANT CHANGE UP (ref 7–14)
AST SERPL-CCNC: 14 U/L — SIGNIFICANT CHANGE UP (ref 0–41)
BASOPHILS # BLD AUTO: 0.03 K/UL — SIGNIFICANT CHANGE UP (ref 0–0.2)
BASOPHILS NFR BLD AUTO: 0.4 % — SIGNIFICANT CHANGE UP (ref 0–1)
BILIRUB SERPL-MCNC: 1 MG/DL — SIGNIFICANT CHANGE UP (ref 0.2–1.2)
BUN SERPL-MCNC: 9 MG/DL — LOW (ref 10–20)
CALCIUM SERPL-MCNC: 8.6 MG/DL — SIGNIFICANT CHANGE UP (ref 8.4–10.5)
CHLORIDE SERPL-SCNC: 105 MMOL/L — SIGNIFICANT CHANGE UP (ref 98–110)
CO2 SERPL-SCNC: 26 MMOL/L — SIGNIFICANT CHANGE UP (ref 17–32)
CREAT SERPL-MCNC: 0.7 MG/DL — SIGNIFICANT CHANGE UP (ref 0.7–1.5)
EGFR: 97 ML/MIN/1.73M2 — SIGNIFICANT CHANGE UP
EOSINOPHIL # BLD AUTO: 0.24 K/UL — SIGNIFICANT CHANGE UP (ref 0–0.7)
EOSINOPHIL NFR BLD AUTO: 3.2 % — SIGNIFICANT CHANGE UP (ref 0–8)
GLUCOSE SERPL-MCNC: 151 MG/DL — HIGH (ref 70–99)
HCT VFR BLD CALC: 37.2 % — SIGNIFICANT CHANGE UP (ref 37–47)
HGB BLD-MCNC: 12.2 G/DL — SIGNIFICANT CHANGE UP (ref 12–16)
IMM GRANULOCYTES NFR BLD AUTO: 0.4 % — HIGH (ref 0.1–0.3)
LYMPHOCYTES # BLD AUTO: 1.29 K/UL — SIGNIFICANT CHANGE UP (ref 1.2–3.4)
LYMPHOCYTES # BLD AUTO: 17.3 % — LOW (ref 20.5–51.1)
MAGNESIUM SERPL-MCNC: 1.8 MG/DL — SIGNIFICANT CHANGE UP (ref 1.8–2.4)
MCHC RBC-ENTMCNC: 28.9 PG — SIGNIFICANT CHANGE UP (ref 27–31)
MCHC RBC-ENTMCNC: 32.8 G/DL — SIGNIFICANT CHANGE UP (ref 32–37)
MCV RBC AUTO: 88.2 FL — SIGNIFICANT CHANGE UP (ref 81–99)
MONOCYTES # BLD AUTO: 0.7 K/UL — HIGH (ref 0.1–0.6)
MONOCYTES NFR BLD AUTO: 9.4 % — HIGH (ref 1.7–9.3)
NEUTROPHILS # BLD AUTO: 5.17 K/UL — SIGNIFICANT CHANGE UP (ref 1.4–6.5)
NEUTROPHILS NFR BLD AUTO: 69.3 % — SIGNIFICANT CHANGE UP (ref 42.2–75.2)
NRBC # BLD: 0 /100 WBCS — SIGNIFICANT CHANGE UP (ref 0–0)
PLATELET # BLD AUTO: 171 K/UL — SIGNIFICANT CHANGE UP (ref 130–400)
POTASSIUM SERPL-MCNC: 4.1 MMOL/L — SIGNIFICANT CHANGE UP (ref 3.5–5)
POTASSIUM SERPL-SCNC: 4.1 MMOL/L — SIGNIFICANT CHANGE UP (ref 3.5–5)
PROT SERPL-MCNC: 5.9 G/DL — LOW (ref 6–8)
RBC # BLD: 4.22 M/UL — SIGNIFICANT CHANGE UP (ref 4.2–5.4)
RBC # FLD: 14.9 % — HIGH (ref 11.5–14.5)
SODIUM SERPL-SCNC: 142 MMOL/L — SIGNIFICANT CHANGE UP (ref 135–146)
WBC # BLD: 7.46 K/UL — SIGNIFICANT CHANGE UP (ref 4.8–10.8)
WBC # FLD AUTO: 7.46 K/UL — SIGNIFICANT CHANGE UP (ref 4.8–10.8)

## 2022-10-04 PROCEDURE — 99232 SBSQ HOSP IP/OBS MODERATE 35: CPT

## 2022-10-04 RX ORDER — FUROSEMIDE 40 MG
1 TABLET ORAL
Qty: 0 | Refills: 0 | DISCHARGE

## 2022-10-04 RX ADMIN — FAMOTIDINE 40 MILLIGRAM(S): 10 INJECTION INTRAVENOUS at 06:19

## 2022-10-04 RX ADMIN — Medication 50 MILLIGRAM(S): at 06:19

## 2022-10-04 RX ADMIN — Medication 1 SPRAY(S): at 00:41

## 2022-10-04 RX ADMIN — SPIRONOLACTONE 25 MILLIGRAM(S): 25 TABLET, FILM COATED ORAL at 06:19

## 2022-10-04 NOTE — DISCHARGE NOTE PROVIDER - CARE PROVIDER_API CALL
Dipesh Preciado  GASTROENTEROLOGY  [address] [place] [address]  Phone: [phone]  Fax: [phone]  Follow Up Time: 1-3 days    Aden Zepeda)  Hematology; Internal Medicine; Medical Oncology  [address] [place] [address]  Phone: [phone]  Fax: [phone]  Follow Up Time: 2 weeks

## 2022-10-04 NOTE — MEDICAL STUDENT PROGRESS NOTE(EDUCATION) - SUBJECTIVE AND OBJECTIVE BOX
ALAN AGUIRRER 61y Female  MRN#: [id number]   CODE STATUS:    Hospital Day: 2d    SUBJECTIVE  no overnight events   Pt was seen at bedside resting and about to eat her breakfast  Pt denies any abdominal pain and reports feeling better than when she was first admitted                                            ----------------------------------------------------------  OBJECTIVE  PAST MEDICAL & SURGICAL HISTORY                                            -----------------------------------------------------------  ALLERGIES:  codeine (Unknown)                                            ------------------------------------------------------------    HOME MEDICATIONS  Home Medications:  atorvastatin 20 mg oral tablet: 1 tab(s) orally once a day (03 Oct 2022 01:26)  famotidine 40 mg oral tablet: 1 tab(s) orally 2 times a day (03 Oct 2022 01:27)  furosemide 40 mg oral tablet: 1 tab(s) orally once a day (03 Oct 2022 01:28)  metoprolol succinate 50 mg oral tablet, extended release: 1 tab(s) orally once a day (03 Oct 2022 01:28)  spironolactone 25 mg oral tablet: 1 tab(s) orally once a day (03 Oct 2022 01:29)  traMADol 50 mg oral tablet: 1 tab(s) orally every 6 hours, As Needed (03 Oct 2022 01:28)  zolpidem 5 mg oral tablet: 1 tab(s) orally once a day (at bedtime), As Needed (03 Oct 2022 01:28)                           MEDICATIONS:  STANDING MEDICATIONS  atorvastatin 20 milliGRAM(s) Oral at bedtime  enoxaparin Injectable 40 milliGRAM(s) SubCutaneous every 24 hours  famotidine    Tablet 40 milliGRAM(s) Oral two times a day  influenza   Vaccine 0.5 milliLiter(s) IntraMuscular once  lactated ringers. 1000 milliLiter(s) IV Continuous <Continuous>  metoprolol succinate ER 50 milliGRAM(s) Oral daily  spironolactone 25 milliGRAM(s) Oral daily    PRN MEDICATIONS  acetaminophen     Tablet .. 650 milliGRAM(s) Oral every 6 hours PRN  aluminum hydroxide/magnesium hydroxide/simethicone Suspension 30 milliLiter(s) Oral every 4 hours PRN  ketorolac   Injectable 15 milliGRAM(s) IV Push every 6 hours PRN  melatonin 3 milliGRAM(s) Oral at bedtime PRN  ondansetron Injectable 4 milliGRAM(s) IV Push every 8 hours PRN  traMADol 50 milliGRAM(s) Oral every 6 hours PRN  zolpidem 5 milliGRAM(s) Oral at bedtime PRN                                            ------------------------------------------------------------  VITAL SIGNS: Last 24 Hours  T(C): 36.7 (03 Oct 2022 21:58), Max: 37.2 (03 Oct 2022 13:26)  T(F): 98.1 (03 Oct 2022 21:58), Max: 98.9 (03 Oct 2022 13:26)  HR: 84 (04 Oct 2022 04:45) (67 - 89)  BP: 113/66 (04 Oct 2022 04:45) (113/66 - 136/66)  BP(mean): --  RR: 18 (04 Oct 2022 04:45) (18 - 18)  SpO2: 94% (03 Oct 2022 21:58) (94% - 94%)      10-03-22 @ 07:01  -  10-04-22 @ 07:00  --------------------------------------------------------  IN: 1710 mL / OUT: 0 mL / NET: 1710 mL    10-04-22 @ 07:01  -  10-04-22 @ 09:43  --------------------------------------------------------  IN: 120 mL / OUT: 0 mL / NET: 120 mL                                             --------------------------------------------------------------  LABS:                        13.5   10.88 )-----------( 175      ( 03 Oct 2022 11:07 )             39.7     10    144  |  107  |  14  ----------------------------<  145<H>  4.2   |  25  |  0.8    Ca    8.7      03 Oct 2022 11:07    TPro  6.2  /  Alb  4.1  /  TBili  0.8  /  DBili  x   /  AST  17  /  ALT  12  /  AlkPhos  86  10-03      Urinalysis Basic - ( 02 Oct 2022 16:03 )    Color: Light Yellow / Appearance: Clear / S.013 / pH: x  Gluc: x / Ketone: Negative  / Bili: Negative / Urobili: <2 mg/dL   Blood: x / Protein: Trace / Nitrite: Negative   Leuk Esterase: Negative / RBC: x / WBC x   Sq Epi: x / Non Sq Epi: x / Bacteria: x    Culture - Urine (collected 02 Oct 2022 16:03)  Source: Clean Catch Clean Catch (Midstream)  Final Report (03 Oct 2022 23:25):    <10,000 CFU/mL Normal Urogenital Valarie      PHYSICAL EXAM:  T(C): 36.7 (10-03-22 @ 21:58), Max: 37.2 (10-03-22 @ 13:26)  HR: 84 (10-04-22 @ 04:45) (67 - 89)  BP: 113/66 (10-04-22 @ 04:45) (113/66 - 136/66)  RR: 18 (10-04-22 @ 04:45) (18 - 18)  SpO2: 94% (10-03-22 @ 21:58) (94% - 94%)    CONSTITUTIONAL: Well groomed, no apparent distress  EYES: PERRLA and symmetric, EOMI, No conjunctival or scleral injection, non-icteric  ENMT: Oral mucosa with moist membranes. Normal dentition; no pharyngeal injection or exudates             NECK: Supple, symmetric and without tracheal deviation   RESP: No respiratory distress, no use of accessory muscles; CTA b/l, no WRR  CV: RRR, +S1S2, no MRG; no JVD; no peripheral edema  GI: Soft, NT, ND, no rebound, no guarding; no palpable masses; no hepatosplenomegaly; no hernia palpated  LYMPH: No cervical LAD or tenderness; no axillary LAD or tenderness; no inguinal LAD or tenderness  MSK: Normal gait; No digital clubbing or cyanosis; examination of the (head/neck/spine/ribs/pelvis, RUE, LUE, RLE, LLE) without misalignment,            Normal ROM without pain, no spinal tenderness, normal muscle strength/tone  SKIN: No rashes or ulcers noted; no subcutaneous nodules or induration palpable  NEURO: CN II-XII intact; normal reflexes in upper and lower extremities, sensation intact in upper and lower extremities b/l to light touch   PSYCH: Appropriate insight/judgment; A+O x 3, mood and affect appropriate, recent/remote memory intact

## 2022-10-04 NOTE — DISCHARGE NOTE NURSING/CASE MANAGEMENT/SOCIAL WORK - NSDCPEFALRISK_GEN_ALL_CORE
For information on Fall & Injury Prevention, visit: [email or web address] OR  [email or web address] OR  https://www.cdc.gov/steadi/patient.html

## 2022-10-04 NOTE — DISCHARGE NOTE PROVIDER - NSDCMRMEDTOKEN_GEN_ALL_CORE_FT
atorvastatin 20 mg oral tablet: 1 tab(s) orally once a day  famotidine 40 mg oral tablet: 1 tab(s) orally 2 times a day  metoprolol succinate 50 mg oral tablet, extended release: 1 tab(s) orally once a day  spironolactone 25 mg oral tablet: 1 tab(s) orally once a day  traMADol 50 mg oral tablet: 1 tab(s) orally every 6 hours, As Needed  zolpidem 5 mg oral tablet: 1 tab(s) orally once a day (at bedtime), As Needed

## 2022-10-04 NOTE — DISCHARGE NOTE PROVIDER - HOSPITAL COURSE
61-year-old female PMH of Hypertension and hyperlipidemia , ovacian cancer s/p BL oophorectomy in 2013. CCE 2001, presented with abdominal pain.    Reported that symptoms started this morning; pain is in the left side of her abdomen, constant, dull pain, and there is no alleviating or worsening factor.    In the ED:  Bp:162/73  HR: 91   T : 98  O2 : 99 on RA     Lipase :806  CT abdomen/pelvis l Inflammatory changes surrounding the tail of pancreas compatible with acute pancreatitis  Nonspecific sclerotic foci within the osseous structures.    Pt was admitted for Pancreatitis    Hosp course  - US abd neg  - triglycerides were normal  - no recent alcohol use  - pt tolerated diet   - Pt has GI appointment francis and will follow up with PMD in a 2 weeks

## 2022-10-04 NOTE — MEDICAL STUDENT PROGRESS NOTE(EDUCATION) - NS MD HP STUD ASPLAN PLAN FT
# Acute interstitial Pancreatitis - r/o passed CBD stone vs medication induced vs pancreatic ductal pathology  - abd pain present - improved  - elevated lipase and CT abd showing Inflammatory changes surrounding the tail of pancreas compatible with acute pancreatitis.  - TG level normal  - s/p cholecystectomy. LFTs normal  - rare alcohol intake  - d/c IV fluids  - Patient tolerated clears --> advance to low fat  - Patient will need EUS as outpatient  -  IGG4 level  - consider switching Lasix to different diuretic if possible

## 2022-10-04 NOTE — PROGRESS NOTE ADULT - ASSESSMENT
61-year-old female   PMH: Hypertension and hyperlipidemia   Presents with abdominal pain.      # Pancreatitis   - Lipase :806 on admission decreased 10/3/22 to 150  -triglycerides 124  - CT abdomen/pelvis l Inflammatory changes surrounding the tail of pancreas compatible with acute pancreatitis  Nonspecific sclerotic foci within the osseous structures.  - CCE in 2001  - US abdomen: limited evaluation   - dc fluids  - Ketoralax 15 IV Q6 PRN  - Zofran 4mg IV Q8  - advanced to low fat diet     # LE Edema   - home laxis was dc, c/w spironolactone   - LE elevation   -< from: TTE Echo Complete w/o Contrast w/ Doppler (10.03.22 @ 08:02) >   1. Left ventricular ejection fraction, by visual estimation, is 60 to   65%.   2. Normal left ventricular size and wall thicknesses, with normal   systolic and diastolic function.   3. Mild mitral regurgitation.   4. Mild dilatation of the ascending aorta (4.0 cm).    < end of copied text >      # HT   - c/w home metoprolol     # HLD   - c/w home statin     # Insomnia   # chronic pain  - c/w home tramadol PRN  - c/w home zolpidem PRN    # DVT : lovenox   # Diet : DASH - low fat   # Activity : As colette   # Dispo : from home

## 2022-10-04 NOTE — PROGRESS NOTE ADULT - ASSESSMENT
61-year-old female  with pmh of Hypertension and hyperlipidemia , ovarian cancer s/p BL oophorectomy in 2013. cholecystectomy in 2001, Presents with abdominal pain.  Patient reports yesterday morning she started having mid abd pain radiating to lower abdomen and to epigastric area. Pain was not related to food, and she only drinks alcohol on rare occasions. there was no nausea, vomiting, diarrhea, constipation, wt loss, fever, or any other symptoms. Pain was stabbing in nature and requiring tramadol for relief. to note that she had cholecystectomy many years ago and never had pancreatitis in the past. no FHx of pancreatic disease. non smoker. her home meds include a loop diuretic which she has been on chronically.  In the ED, she was HD stable. labs showed leukocytosis 11K and Lipase :806. LFTs and TG level were normal. CT abdomen/pelvis showed Inflammatory changes surrounding the tail of pancreas compatible with acute pancreatitis. US abd showed CBD 6 mm and s/p cholecystectomy. she is on LR at 150/hr and is on clears    # Acute interstitial Pancreatitis - r/o passed CBD stone vs medication induced vs pancreatic ductal pathology  - abd pain present - improved  - elevated lipase and CT abd showing Inflammatory changes surrounding the tail of pancreas compatible with acute pancreatitis.  - TG level normal  - s/p cholecystectomy. LFTs normal  - rare alcohol intake    Plan  - ok to d/c IV fluids  - Patient tolerated clears --> advance to low fat  - Patient will need EUS as outpatient  - check IGG4 level  - consider switching Lasix to different diuretic if possible   61-year-old female  with pmh of Hypertension and hyperlipidemia , ovarian cancer s/p BL oophorectomy in 2013. cholecystectomy in 2001, Presents with abdominal pain.  Patient reports yesterday morning she started having mid abd pain radiating to lower abdomen and to epigastric area. Pain was not related to food, and she only drinks alcohol on rare occasions. there was no nausea, vomiting, diarrhea, constipation, wt loss, fever, or any other symptoms. Pain was stabbing in nature and requiring tramadol for relief. to note that she had cholecystectomy many years ago and never had pancreatitis in the past. no FHx of pancreatic disease. non smoker. her home meds include a loop diuretic which she has been on chronically.  In the ED, she was HD stable. labs showed leukocytosis 11K and Lipase :806. LFTs and TG level were normal. CT abdomen/pelvis showed Inflammatory changes surrounding the tail of pancreas compatible with acute pancreatitis. US abd showed CBD 6 mm and s/p cholecystectomy. she is on LR at 150/hr and is on clears    # Acute interstitial Pancreatitis - r/o passed CBD stone vs medication induced vs pancreatic ductal pathology  - abd pain present - improved  - elevated lipase and CT abd showing Inflammatory changes surrounding the tail of pancreas compatible with acute pancreatitis.  - TG level normal  - s/p cholecystectomy. LFTs normal  - rare alcohol intake    Plan  - ok to d/c IV fluids  - Patient tolerated clears --> advance to low fat  - Patient will need EUS as outpatient. Please f/u in [facility] with advanced endoscopy team  - check IGG4 level  - consider switching Lasix to different diuretic if possible   61-year-old female  with pmh of Hypertension and hyperlipidemia , ovarian cancer s/p BL oophorectomy in 2013. cholecystectomy in 2001, Presents with abdominal pain.  Patient reports yesterday morning she started having mid abd pain radiating to lower abdomen and to epigastric area. Pain was not related to food, and she only drinks alcohol on rare occasions. there was no nausea, vomiting, diarrhea, constipation, wt loss, fever, or any other symptoms. Pain was stabbing in nature and requiring tramadol for relief. to note that she had cholecystectomy many years ago and never had pancreatitis in the past. no FHx of pancreatic disease. non smoker. her home meds include a loop diuretic which she has been on chronically.  In the ED, she was HD stable. labs showed leukocytosis 11K and Lipase :806. LFTs and TG level were normal. CT abdomen/pelvis showed Inflammatory changes surrounding the tail of pancreas compatible with acute pancreatitis. US abd showed CBD 6 mm and s/p cholecystectomy. she is on LR at 150/hr and is on clears    # Acute interstitial Pancreatitis - r/o passed CBD stone vs medication induced vs pancreatic ductal pathology  - abd pain present - improved  - elevated lipase and CT abd showing Inflammatory changes surrounding the tail of pancreas compatible with acute pancreatitis.  - TG level normal  - s/p cholecystectomy. LFTs normal  - rare alcohol intake    Plan  - ok to d/c IV fluids  - Patient tolerated clears --> advance to low fat  - Patient will need EUS as outpatient. Please f/u in [facility] with advanced endoscopy team  - check IGG4 level  - consider switching Lasix to different diuretic if possible  - will sign off   61-year-old female  with pmh of Hypertension and hyperlipidemia , ovarian cancer s/p BL oophorectomy in 2013. cholecystectomy in 2001, Presents with abdominal pain.  Patient reports yesterday morning she started having mid abd pain radiating to lower abdomen and to epigastric area. Pain was not related to food, and she only drinks alcohol on rare occasions. there was no nausea, vomiting, diarrhea, constipation, wt loss, fever, or any other symptoms. Pain was stabbing in nature and requiring tramadol for relief. to note that she had cholecystectomy many years ago and never had pancreatitis in the past. no FHx of pancreatic disease. non smoker. her home meds include a loop diuretic which she has been on chronically.  In the ED, she was HD stable. labs showed leukocytosis 11K and Lipase :806. LFTs and TG level were normal. CT abdomen/pelvis showed Inflammatory changes surrounding the tail of pancreas compatible with acute pancreatitis. US abd showed CBD 6 mm and s/p cholecystectomy. she is on LR at 150/hr and is on clears    # Acute interstitial Pancreatitis - r/o passed CBD stone vs medication induced vs pancreatic ductal pathology  - abd pain present - improved  - elevated lipase and CT abd showing Inflammatory changes surrounding the tail of pancreas compatible with acute pancreatitis.  - TG level normal  - s/p cholecystectomy. LFTs normal  - rare alcohol intake    Plan  - Patient tolerated clears --> advance to low fat  - Patient will need EUS as outpatient. Please f/u in [facility] with advanced endoscopy team, Follow-up with our [facility] [phone]   - check IGG4 level  - consider switching Lasix to different diuretic if possible

## 2022-10-04 NOTE — PROGRESS NOTE ADULT - ASSESSMENT
61-year-old female with a hx of Hypertension and hyperlipidemia. Patient presented to ER with abdominal pain.      Pancreatitis   - Lipase :806 on admission now decreased to 150  - Triglycerides 124  - sono and CT resulted  - post cholecystectomy in 2001  - advanced to low fat diet   - GI f/u as outpatient    LE Edema   - home Lasix was discontinued as per GI recommendations  - started on Spironolactone     HTN  - continue Metoprolol     HLD   - continue statin     Insomnia   Chronic pain  - continue Tramadol PRN  - continue Zolpidem PRN    DVT : Lovenox   Diet : DASH - low fat   Activity : As colette   Dispo : discharge home today. Patient has an appt with Dr Preciado tomorrow

## 2022-10-04 NOTE — DISCHARGE NOTE NURSING/CASE MANAGEMENT/SOCIAL WORK - PATIENT PORTAL LINK FT
You can access the FollowMyHealth Patient Portal offered by [facility] by registering at the following website: [email or web address]. By joining [employer]’s FollowMyHealth portal, you will also be able to view your health information using other applications (apps) compatible with our system.

## 2022-10-04 NOTE — DISCHARGE NOTE PROVIDER - NSDCCPCAREPLAN_GEN_ALL_CORE_FT
PRINCIPAL DISCHARGE DIAGNOSIS  Diagnosis: Pancreatitis  Assessment and Plan of Treatment: Pancreatitis  Pancreatitis is a condition in which the pancreas becomes irritated and swollen (inflammation). The pancreas is a gland that is located behind the stomach. It produces enzymes that help to digest food. Most acute attacks last a couple of days and can cause serious problems and even be life threatening. The most common causes are alcohol abuse and gallstones. Symptoms include pain in the upper abdomen that may radiate to the back, nausea, and vomiting. Diagnosis is made with a medical history and physical exam as well as additional diagnostic testing. At home, eat smaller, more frequent meals and avoid alcohol and fatty foods. Drink enough fluid to keep your urine clear or pale yellow.   So far you had ultrasound of the abdomen that was negative, your triglycerides were normal. We are suspecting either auto immune causes or medication induced because of which we are discontinuing your lasix.   Please follow up with your GI doctor and PMD for further management  SEEK IMMEDIATE MEDICAL CARE IF YOU HAVE ANY OF THE FOLLOWING SYMPTOMS: inability to keep fluids down, increasing pain, yellowing of your skin or eyes, or lightheadedness/dizziness.         PRINCIPAL DISCHARGE DIAGNOSIS  Diagnosis: Pancreatitis  Assessment and Plan of Treatment: Pancreatitis  Pancreatitis is a condition in which the pancreas becomes irritated and swollen (inflammation). The pancreas is a gland that is located behind the stomach. It produces enzymes that help to digest food. Most acute attacks last a couple of days and can cause serious problems and even be life threatening. The most common causes are alcohol abuse and gallstones. Symptoms include pain in the upper abdomen that may radiate to the back, nausea, and vomiting. Diagnosis is made with a medical history and physical exam as well as additional diagnostic testing. At home, eat smaller, more frequent meals and avoid alcohol and fatty foods. Drink enough fluid to keep your urine clear or pale yellow.   So far you had ultrasound of the abdomen that was negative, your triglycerides were normal. We are suspecting either auto immune causes or medication induced because of which we are discontinuing your lasix.   Please follow up with your GI doctor and PMD for further management and to follow up on your IGG4 levels which is blood work test we did here  SEEK IMMEDIATE MEDICAL CARE IF YOU HAVE ANY OF THE FOLLOWING SYMPTOMS: inability to keep fluids down, increasing pain, yellowing of your skin or eyes, or lightheadedness/dizziness.

## 2022-10-04 NOTE — DISCHARGE NOTE PROVIDER - PROVIDER TOKENS
PROVIDER:[TOKEN:[[id number]],FOLLOWUP:[1-3 days]],PROVIDER:[TOKEN:[[id number]],FOLLOWUP:[2 weeks]]

## 2022-10-04 NOTE — MEDICAL STUDENT PROGRESS NOTE(EDUCATION) - NS MD HP STUD ASPLAN ASSES FT
61-year-old female  with pmh of Hypertension and hyperlipidemia , ovarian cancer s/p BL oophorectomy in 2013. cholecystectomy in 2001, presents with abdominal pain radiating to lower abdomen and to epigastric area. Pain was not related to food, and she only drinks alcohol on rare occasions. there was no nausea, vomiting, diarrhea, constipation, wt loss, fever, or any other symptoms. Patient is being treated for acute pancreatitis based on CT scan findings of inflammatory changes at the pancreatic tail.

## 2022-10-04 NOTE — PROGRESS NOTE ADULT - SUBJECTIVE AND OBJECTIVE BOX
PADDYALAN  61y  Female  HPI:  61-year-old female     PMH: Hypertension and hyperlipidemia , ovacian cancer s/p BL oophorectomy in 2013. CCE 2001    Presents with abdominal pain.      Reports that symptoms started this morning; pain is in the left side of her abdomen, constant, dull pain, and there is no alleviating or worsening factor.    Denies fever, shortness of breath, chest pain, nausea, vomiting, hematuria, dysuria, and change in bowel movement    In the ED:  Bp:162/73  HR: 91   T : 98  O2 : 99 on RA     Lipase :806  CT abdomen/pelvis l Inflammatory changes surrounding the tail of pancreas compatible with acute pancreatitis  Nonspecific sclerotic foci within the osseous structures. (03 Oct 2022 00:48)    MEDICATIONS  (STANDING):  atorvastatin 20 milliGRAM(s) Oral at bedtime  enoxaparin Injectable 40 milliGRAM(s) SubCutaneous every 24 hours  famotidine    Tablet 40 milliGRAM(s) Oral two times a day  influenza   Vaccine 0.5 milliLiter(s) IntraMuscular once  metoprolol succinate ER 50 milliGRAM(s) Oral daily  spironolactone 25 milliGRAM(s) Oral daily    MEDICATIONS  (PRN):  acetaminophen     Tablet .. 650 milliGRAM(s) Oral every 6 hours PRN Temp greater or equal to 38C (100.4F), Mild Pain (1 - 3)  aluminum hydroxide/magnesium hydroxide/simethicone Suspension 30 milliLiter(s) Oral every 4 hours PRN Dyspepsia  ketorolac   Injectable 15 milliGRAM(s) IV Push every 6 hours PRN Moderate Pain (4 - 6)  melatonin 3 milliGRAM(s) Oral at bedtime PRN Insomnia  ondansetron Injectable 4 milliGRAM(s) IV Push every 8 hours PRN Nausea and/or Vomiting  traMADol 50 milliGRAM(s) Oral every 6 hours PRN Moderate Pain (4 - 6)  zolpidem 5 milliGRAM(s) Oral at bedtime PRN Insomnia    INTERVAL EVENTS: Patient seen today without distress. Patient 's diet upgraded today, tolerated it without pain.    T(C): --  HR: 84 (10-04-22 @ 04:45) (84 - 84)  BP: 113/66 (10-04-22 @ 04:45) (113/66 - 113/66)  RR: 18 (10-04-22 @ 04:45) (18 - 18)  SpO2: --  Wt(kg): --Vital Signs Last 24 Hrs  T(C): --  T(F): --  HR: 84 (04 Oct 2022 04:45) (84 - 84)  BP: 113/66 (04 Oct 2022 04:45) (113/66 - 113/66)  BP(mean): --  RR: 18 (04 Oct 2022 04:45) (18 - 18)  SpO2: --        PHYSICAL EXAM:  GENERAL: NAD  NECK: Supple, No JVD  CHEST/LUNG: Clear  HEART: S1, S2, Regular   ABDOMEN: Soft, Nontender, Obese,  Bowel sounds present  EXTREMITIES: No edema    LABS:                        12.2   7.46  )-----------( 171      ( 04 Oct 2022 09:06 )             37.2             10-04    142  |  105  |  9<L>  ----------------------------<  151<H>  4.1   |  26  |  0.7    Ca    8.6      04 Oct 2022 09:06  Mg     1.8     10-04    TPro  5.9<L>  /  Alb  3.9  /  TBili  1.0  /  DBili  x   /  AST  14  /  ALT  12  /  AlkPhos  79  10-04    LIVER FUNCTIONS - ( 04 Oct 2022 09:06 )  Alb: 3.9 g/dL / Pro: 5.9 g/dL / ALK PHOS: 79 U/L / ALT: 12 U/L / AST: 14 U/L / GGT: x                     Lipase, Serum: 150 U/L (10.03.22 @ 11:08)   Lipase, Serum: 806 U/L (10.02.22 @ 15:50) Lipid Profile in AM (10.03.22 @ 11:07)   Cholesterol, Serum: 155 mg/dL   Triglycerides, Serum: 125 mg/dL   HDL Cholesterol, Serum: 56 mg/dL   Non HDL Cholesterol: 99: Patients Atherosclerotic Cardiovascular Disease (ASCVD) Risk   Optimal Level (mg/dL)   LDL Cholesterol Calculated  74    Hepatitis C Virus Cutoff Index: .04 COI   Hepatitis C Virus Interpretation Result: Nonreact: This assay has not been FDA licensed for the screening of blood, plasma       Culture - Urine (collected 02 Oct 2022 16:03)  Source: Clean Catch Clean Catch (Midstream)  Final Report (03 Oct 2022 23:25):    <10,000 CFU/mL Normal Urogenital Valarie      RADIOLOGY & ADDITIONAL TESTS:  < from: US Abdomen Upper Quadrant Right (10.03.22 @ 07:57) >  INTERPRETATION:  CLINICAL INFORMATION: Abdominal pain, pancreatitis    COMPARISON: CT abdomen and pelvis 10/2/2022    TECHNIQUE: Sonography of the right upper quadrant.    FINDINGS:  Liver: Limited evaluation by sonography  Bile ducts: Normal caliber. Common bile duct measures 6 mm.  Gallbladder: Status post cholecystectomy.  Pancreas: Limited evaluation of the pancreas by sonography.  Right kidney: Partially obscured and the parenchyma is not well   evaluated. 10.1 cm. No hydronephrosis.  Ascites: None.    IMPRESSION:    Limited evaluation of the pancreas by sonography -see prior CT    Status post cholecystectomy.    --- End of Report ---      < end of copied text >  < from: CT Abdomen and Pelvis w/ IV Cont (10.02.22 @ 17:54) >  INTERPRETATION:  CLINICAL STATEMENT: Abdominal pain. LLQ abd pain    TECHNIQUE: Contiguous axial CT images were obtained from the lower chest   to the pubic symphysis following administration of intravenous contrast.    Oral contrast was not administered. Reformatted images in the coronal and   sagittal planes were acquired.    COMPARISON CT: None    FINDINGS:    LOWER CHEST: Bibasilar subsegmental atelectasis.    HEPATOBILIARY: Patient is post cholecystectomy.    SPLEEN: Within normal limits.    ADRENALS: Within normal limits.    PANCREAS: Inflammatory changes surrounding the tail of pancreas    KIDNEYS: Symmetric renal enhancement. No hydronephrosis. Left renal cyst   and additional hypodensities too small to characterize.    ABDOMINOPELVIC NODES: No enlarged abdominal or pelvic lymph nodes.    PELVIC ORGANS: Uterus not visualized; correlate for hysterectomy..    PERITONEUM/MESENTERY/BOWEL: No bowel obstruction, ascites or free   intraperitoneal air. The appendix is unremarkable. Postsurgical changes   of the colon.    BONES/SOFT TISSUES: No acute osseous abnormality. Sclerotic foci within   the osseous structures, nonspecific      IMPRESSION:  Inflammatory changes surrounding the tail of pancreas compatible with   acute pancreatitis    Nonspecific sclerotic foci within the osseous structures.    --- End of Report ---      < end of copied text >

## 2022-10-04 NOTE — PROGRESS NOTE ADULT - SUBJECTIVE AND OBJECTIVE BOX
Gastroenterology progress note:     Patient is a 61y old  Female who presents with a chief complaint of Pancreatitis (03 Oct 2022 22:58)       Admitted on: 10-02-22    We are following the patient for: pancreatitis       Interval History: Patient seen and examined. she feels fine. her abd pain improved and she is tolerating PO intake (clears) and wants to try solid food. no nausea or vomiting.     No acute events overnight.   - Diet - clears  - last BM - yesterday  - Abdominal pain - mild      PAST MEDICAL & SURGICAL HISTORY:      MEDICATIONS  (STANDING):  atorvastatin 20 milliGRAM(s) Oral at bedtime  enoxaparin Injectable 40 milliGRAM(s) SubCutaneous every 24 hours  famotidine    Tablet 40 milliGRAM(s) Oral two times a day  influenza   Vaccine 0.5 milliLiter(s) IntraMuscular once  lactated ringers. 1000 milliLiter(s) (150 mL/Hr) IV Continuous <Continuous>  metoprolol succinate ER 50 milliGRAM(s) Oral daily  spironolactone 25 milliGRAM(s) Oral daily    MEDICATIONS  (PRN):  acetaminophen     Tablet .. 650 milliGRAM(s) Oral every 6 hours PRN Temp greater or equal to 38C (100.4F), Mild Pain (1 - 3)  aluminum hydroxide/magnesium hydroxide/simethicone Suspension 30 milliLiter(s) Oral every 4 hours PRN Dyspepsia  ketorolac   Injectable 15 milliGRAM(s) IV Push every 6 hours PRN Moderate Pain (4 - 6)  melatonin 3 milliGRAM(s) Oral at bedtime PRN Insomnia  ondansetron Injectable 4 milliGRAM(s) IV Push every 8 hours PRN Nausea and/or Vomiting  traMADol 50 milliGRAM(s) Oral every 6 hours PRN Moderate Pain (4 - 6)  zolpidem 5 milliGRAM(s) Oral at bedtime PRN Insomnia      Allergies  codeine (Unknown)      Review of Systems:   Cardiovascular:  No Chest Pain, No Palpitations  Respiratory:  No Cough, No Dyspnea  Gastrointestinal:  As described in HPI  Skin:  No Skin Lesions, No Jaundice  Neuro:  No Syncope, No Dizziness    Physical Examination:  T(C): 36.7 (10-03-22 @ 21:58), Max: 37.2 (10-03-22 @ 13:26)  HR: 84 (10-04-22 @ 04:45) (67 - 89)  BP: 113/66 (10-04-22 @ 04:45) (113/66 - 136/66)  RR: 18 (10-04-22 @ 04:45) (18 - 18)  SpO2: 94% (10-03-22 @ 21:58) (94% - 94%)      10-03-22 @ 07:01  -  10-04-22 @ 07:00  --------------------------------------------------------  IN: 1710 mL / OUT: 0 mL / NET: 1710 mL        GENERAL:  Appears stated age, well-groomed, well-nourished, no distress  HEENT:  NC/AT,  conjunctivae clear and pink, no thyromegaly, nodules, adenopathy, sclera -anicteric  CHEST:  Full & symmetric excursion, no increased effort, breath sounds clear  HEART:  Regular rhythm, S1, S2,   ABDOMEN:  Soft, epigastric and mid abd tenderness mild. no rebound or signs of peritonitis  EXTEREMITIES:  no cyanosis,clubbing or edema  SKIN:  No rash/erythema/ecchymoses/petechiae/wounds/abscess/warm/dry  NEURO:  Alert, oriented, no asterixis, no tremor, no encephalopathy        Data:                        13.5   10.88 )-----------( 175      ( 03 Oct 2022 11:07 )             39.7     Hgb trend:  13.5  10-03-22 @ 11:07  13.7  10-02-22 @ 15:50        10-03    144  |  107  |  14  ----------------------------<  145<H>  4.2   |  25  |  0.8    Ca    8.7      03 Oct 2022 11:07    TPro  6.2  /  Alb  4.1  /  TBili  0.8  /  DBili  x   /  AST  17  /  ALT  12  /  AlkPhos  86  10-03    Liver panel trend:  TBili 0.8   /   AST 17   /   ALT 12   /   AlkP 86   /   Tptn 6.2   /   Alb 4.1    /   DBili --      10-03  TBili 0.6   /   AST 22   /   ALT 15   /   AlkP 86   /   Tptn 6.7   /   Alb 4.4    /   DBili --      10-02          Culture - Urine (collected 02 Oct 2022 16:03)  Source: Clean Catch Clean Catch (Midstream)  Final Report (03 Oct 2022 23:25):    <10,000 CFU/mL Normal Urogenital Valarie         Radiology:    US Abdomen Upper Quadrant Right:   ACC: [id number] EXAM:  US ABDOMEN RT UPR QUADRANT                          PROCEDURE DATE:  10/03/2022          INTERPRETATION:  CLINICAL INFORMATION: Abdominal pain, pancreatitis    COMPARISON: CT abdomen and pelvis 10/2/2022    TECHNIQUE: Sonography of the right upper quadrant.    FINDINGS:  Liver: Limited evaluation by sonography  Bile ducts: Normal caliber. Common bile duct measures 6 mm.  Gallbladder: Status post cholecystectomy.  Pancreas: Limited evaluation of the pancreas by sonography.  Right kidney: Partially obscured and the parenchyma is not well   evaluated. 10.1 cm. No hydronephrosis.  Ascites: None.    IMPRESSION:    Limited evaluation of the pancreas by sonography -see prior CT    Status post cholecystectomy.    --- End of Report ---          STEPHIE SARAH MD; Resident Radiologist  This document has been electronically signed.  GÉNESIS GARCIA MD; Attending Radiologist  This document has been electronically signed. Oct  3 2022  9:07AM (10-03-22 @ 07:57)     Gastroenterology progress note:     Patient is a 61y old  Female who presents with a chief complaint of Pancreatitis (03 Oct 2022 22:58)       Admitted on: 10-02-22    We are following the patient for: pancreatitis       Interval History: Patient seen and examined. she feels fine. her abd pain improved and she is tolerating PO intake (clears) and wants to try solid food. no nausea or vomiting.     No acute events overnight.   - Diet - clears  - last BM - yesterday  - Abdominal pain - mild      PAST MEDICAL & SURGICAL HISTORY:      MEDICATIONS  (STANDING):  atorvastatin 20 milliGRAM(s) Oral at bedtime  enoxaparin Injectable 40 milliGRAM(s) SubCutaneous every 24 hours  famotidine    Tablet 40 milliGRAM(s) Oral two times a day  influenza   Vaccine 0.5 milliLiter(s) IntraMuscular once  lactated ringers. 1000 milliLiter(s) (150 mL/Hr) IV Continuous <Continuous>  metoprolol succinate ER 50 milliGRAM(s) Oral daily  spironolactone 25 milliGRAM(s) Oral daily    MEDICATIONS  (PRN):  acetaminophen     Tablet .. 650 milliGRAM(s) Oral every 6 hours PRN Temp greater or equal to 38C (100.4F), Mild Pain (1 - 3)  aluminum hydroxide/magnesium hydroxide/simethicone Suspension 30 milliLiter(s) Oral every 4 hours PRN Dyspepsia  ketorolac   Injectable 15 milliGRAM(s) IV Push every 6 hours PRN Moderate Pain (4 - 6)  melatonin 3 milliGRAM(s) Oral at bedtime PRN Insomnia  ondansetron Injectable 4 milliGRAM(s) IV Push every 8 hours PRN Nausea and/or Vomiting  traMADol 50 milliGRAM(s) Oral every 6 hours PRN Moderate Pain (4 - 6)  zolpidem 5 milliGRAM(s) Oral at bedtime PRN Insomnia      Allergies  codeine (Unknown)      Review of Systems:   Cardiovascular:  No Chest Pain, No Palpitations  Respiratory:  No Cough, No Dyspnea  Gastrointestinal:  As described in HPI  Skin:  No Skin Lesions, No Jaundice  Neuro:  No Syncope, No Dizziness    Physical Examination:  T(C): 36.7 (10-03-22 @ 21:58), Max: 37.2 (10-03-22 @ 13:26)  HR: 84 (10-04-22 @ 04:45) (67 - 89)  BP: 113/66 (10-04-22 @ 04:45) (113/66 - 136/66)  RR: 18 (10-04-22 @ 04:45) (18 - 18)  SpO2: 94% (10-03-22 @ 21:58) (94% - 94%)      10-03-22 @ 07:01  -  10-04-22 @ 07:00  --------------------------------------------------------  IN: 1710 mL / OUT: 0 mL / NET: 1710 mL        GENERAL:  Appears stated age, well-groomed, well-nourished, no distress  HEENT:  NC/AT,  conjunctivae clear and pink,  sclera -anicteric  CHEST:  Full & symmetric excursion, no increased effort, breath sounds clear  HEART:  Regular rhythm, S1, S2,   ABDOMEN:  Soft, epigastric and mid abd tenderness mild. no rebound or signs of peritonitis  EXTEREMITIES:  no cyanosis,clubbing or edema  SKIN:  No rash/erythema  NEURO:  Alert, oriented, no asterixis, no tremor, no encephalopathy        Data:                        13.5   10.88 )-----------( 175      ( 03 Oct 2022 11:07 )             39.7     Hgb trend:  13.5  10-03-22 @ 11:07  13.7  10-02-22 @ 15:50        10-03    144  |  107  |  14  ----------------------------<  145<H>  4.2   |  25  |  0.8    Ca    8.7      03 Oct 2022 11:07    TPro  6.2  /  Alb  4.1  /  TBili  0.8  /  DBili  x   /  AST  17  /  ALT  12  /  AlkPhos  86  10-03    Liver panel trend:  TBili 0.8   /   AST 17   /   ALT 12   /   AlkP 86   /   Tptn 6.2   /   Alb 4.1    /   DBili --      10-03  TBili 0.6   /   AST 22   /   ALT 15   /   AlkP 86   /   Tptn 6.7   /   Alb 4.4    /   DBili --      10-02          Culture - Urine (collected 02 Oct 2022 16:03)  Source: Clean Catch Clean Catch (Midstream)  Final Report (03 Oct 2022 23:25):    <10,000 CFU/mL Normal Urogenital Valarie         Radiology:    US Abdomen Upper Quadrant Right:   ACC: [id number] EXAM:  US ABDOMEN RT UPR QUADRANT                          PROCEDURE DATE:  10/03/2022          INTERPRETATION:  CLINICAL INFORMATION: Abdominal pain, pancreatitis    COMPARISON: CT abdomen and pelvis 10/2/2022    TECHNIQUE: Sonography of the right upper quadrant.    FINDINGS:  Liver: Limited evaluation by sonography  Bile ducts: Normal caliber. Common bile duct measures 6 mm.  Gallbladder: Status post cholecystectomy.  Pancreas: Limited evaluation of the pancreas by sonography.  Right kidney: Partially obscured and the parenchyma is not well   evaluated. 10.1 cm. No hydronephrosis.  Ascites: None.    IMPRESSION:    Limited evaluation of the pancreas by sonography -see prior CT    Status post cholecystectomy.    --- End of Report ---          STEPHIE SARAH MD; Resident Radiologist  This document has been electronically signed.  GÉNESIS GARCIA MD; Attending Radiologist  This document has been electronically signed. Oct  3 2022  9:07AM (10-03-22 @ 07:57)

## 2022-10-04 NOTE — PROGRESS NOTE ADULT - SUBJECTIVE AND OBJECTIVE BOX
SUBJECTIVE:    Patient is a 61y old Female who presents with a chief complaint of Pancreatitis (04 Oct 2022 09:01)    Currently admitted to medicine with the primary diagnosis of:    Today is hospital day 2d.     Overnight Events:     dc lasix and stared spironolactone     PAST MEDICAL & SURGICAL HISTORY        ALLERGIES:  codeine (Unknown)    MEDICATIONS:  STANDING MEDICATIONS  atorvastatin 20 milliGRAM(s) Oral at bedtime  enoxaparin Injectable 40 milliGRAM(s) SubCutaneous every 24 hours  famotidine    Tablet 40 milliGRAM(s) Oral two times a day  influenza   Vaccine 0.5 milliLiter(s) IntraMuscular once  metoprolol succinate ER 50 milliGRAM(s) Oral daily  spironolactone 25 milliGRAM(s) Oral daily    PRN MEDICATIONS  acetaminophen     Tablet .. 650 milliGRAM(s) Oral every 6 hours PRN  aluminum hydroxide/magnesium hydroxide/simethicone Suspension 30 milliLiter(s) Oral every 4 hours PRN  ketorolac   Injectable 15 milliGRAM(s) IV Push every 6 hours PRN  melatonin 3 milliGRAM(s) Oral at bedtime PRN  ondansetron Injectable 4 milliGRAM(s) IV Push every 8 hours PRN  traMADol 50 milliGRAM(s) Oral every 6 hours PRN  zolpidem 5 milliGRAM(s) Oral at bedtime PRN    VITALS:   ICU Vital Signs Last 24 Hrs  T(C): 36.7 (03 Oct 2022 21:58), Max: 37.2 (03 Oct 2022 13:26)  T(F): 98.1 (03 Oct 2022 21:58), Max: 98.9 (03 Oct 2022 13:26)  HR: 84 (04 Oct 2022 04:45) (67 - 89)  BP: 113/66 (04 Oct 2022 04:45) (113/66 - 136/66)  BP(mean): --  ABP: --  ABP(mean): --  RR: 18 (04 Oct 2022 04:45) (18 - 18)  SpO2: 94% (03 Oct 2022 21:58) (94% - 94%)    O2 Parameters below as of 03 Oct 2022 21:58  Patient On (Oxygen Delivery Method): room air            LABS:                        12.2   7.46  )-----------( 171      ( 04 Oct 2022 09:06 )             37.2     10-03    144  |  107  |  14  ----------------------------<  145<H>  4.2   |  25  |  0.8    Ca    8.7      03 Oct 2022 11:07    TPro  6.2  /  Alb  4.1  /  TBili  0.8  /  DBili  x   /  AST  17  /  ALT  12  /  AlkPhos  86  10      Urinalysis Basic - ( 02 Oct 2022 16:03 )    Color: Light Yellow / Appearance: Clear / S.013 / pH: x  Gluc: x / Ketone: Negative  / Bili: Negative / Urobili: <2 mg/dL   Blood: x / Protein: Trace / Nitrite: Negative   Leuk Esterase: Negative / RBC: x / WBC x   Sq Epi: x / Non Sq Epi: x / Bacteria: x            Culture - Urine (collected 02 Oct 2022 16:03)  Source: Clean Catch Clean Catch (Midstream)  Final Report (03 Oct 2022 23:25):    <10,000 CFU/mL Normal Urogenital Valarie            RADIOLOGY:  < from: US Abdomen Upper Quadrant Right (10.03.22 @ 07:57) >  IMPRESSION:    Limited evaluation of the pancreas by sonography -see prior CT    Status post cholecystectomy.    < end of copied text >    PHYSICAL EXAM:  GEN: laying in bed. feeling improved  LUNGS: clear bl  HEART: s1 s2  ABD: warm, however nontender   EXT: lower extremity edema  NEURO: aox3

## 2022-10-07 DIAGNOSIS — I10 ESSENTIAL (PRIMARY) HYPERTENSION: ICD-10-CM

## 2022-10-07 DIAGNOSIS — E78.5 HYPERLIPIDEMIA, UNSPECIFIED: ICD-10-CM

## 2022-10-07 DIAGNOSIS — Z88.5 ALLERGY STATUS TO NARCOTIC AGENT: ICD-10-CM

## 2022-10-07 DIAGNOSIS — Z79.891 LONG TERM (CURRENT) USE OF OPIATE ANALGESIC: ICD-10-CM

## 2022-10-07 DIAGNOSIS — Z85.43 PERSONAL HISTORY OF MALIGNANT NEOPLASM OF OVARY: ICD-10-CM

## 2022-10-07 DIAGNOSIS — G47.00 INSOMNIA, UNSPECIFIED: ICD-10-CM

## 2022-10-07 DIAGNOSIS — G89.29 OTHER CHRONIC PAIN: ICD-10-CM

## 2022-10-07 DIAGNOSIS — K85.80 OTHER ACUTE PANCREATITIS WITHOUT NECROSIS OR INFECTION: ICD-10-CM

## 2022-10-07 DIAGNOSIS — R10.9 UNSPECIFIED ABDOMINAL PAIN: ICD-10-CM

## 2022-10-07 DIAGNOSIS — Z20.822 CONTACT WITH AND (SUSPECTED) EXPOSURE TO COVID-19: ICD-10-CM

## 2022-10-07 DIAGNOSIS — R60.0 LOCALIZED EDEMA: ICD-10-CM

## 2022-10-07 LAB
IGG SERPL-MCNC: 627 MG/DL — SIGNIFICANT CHANGE UP (ref 586–1602)
IGG1 SER-MCNC: 290 MG/DL — SIGNIFICANT CHANGE UP (ref 248–810)
IGG2 SER-MCNC: 256 MG/DL — SIGNIFICANT CHANGE UP (ref 130–555)
IGG3 SER-MCNC: 22 MG/DL — SIGNIFICANT CHANGE UP (ref 15–102)
IGG4 SER-MCNC: 15 MG/DL — SIGNIFICANT CHANGE UP (ref 2–96)

## 2022-11-21 ENCOUNTER — EMERGENCY (EMERGENCY)
Facility: HOSPITAL | Age: 62
LOS: 0 days | Discharge: HOME | End: 2022-11-21
Attending: EMERGENCY MEDICINE | Admitting: EMERGENCY MEDICINE

## 2022-11-21 VITALS
HEART RATE: 106 BPM | DIASTOLIC BLOOD PRESSURE: 98 MMHG | WEIGHT: 250 LBS | SYSTOLIC BLOOD PRESSURE: 148 MMHG | RESPIRATION RATE: 20 BRPM | HEIGHT: 64 IN | TEMPERATURE: 98 F | OXYGEN SATURATION: 98 %

## 2022-11-21 VITALS — HEART RATE: 98 BPM

## 2022-11-21 DIAGNOSIS — H93.8X9 OTHER SPECIFIED DISORDERS OF EAR, UNSPECIFIED EAR: ICD-10-CM

## 2022-11-21 DIAGNOSIS — R06.7 SNEEZING: ICD-10-CM

## 2022-11-21 DIAGNOSIS — I10 ESSENTIAL (PRIMARY) HYPERTENSION: ICD-10-CM

## 2022-11-21 DIAGNOSIS — Z20.822 CONTACT WITH AND (SUSPECTED) EXPOSURE TO COVID-19: ICD-10-CM

## 2022-11-21 DIAGNOSIS — B34.9 VIRAL INFECTION, UNSPECIFIED: ICD-10-CM

## 2022-11-21 DIAGNOSIS — E78.5 HYPERLIPIDEMIA, UNSPECIFIED: ICD-10-CM

## 2022-11-21 DIAGNOSIS — R05.1 ACUTE COUGH: ICD-10-CM

## 2022-11-21 DIAGNOSIS — Z88.5 ALLERGY STATUS TO NARCOTIC AGENT: ICD-10-CM

## 2022-11-21 LAB
FLUAV AG NPH QL: SIGNIFICANT CHANGE UP
FLUBV AG NPH QL: SIGNIFICANT CHANGE UP
RSV RNA NPH QL NAA+NON-PROBE: SIGNIFICANT CHANGE UP
SARS-COV-2 RNA SPEC QL NAA+PROBE: SIGNIFICANT CHANGE UP

## 2022-11-21 PROCEDURE — 71046 X-RAY EXAM CHEST 2 VIEWS: CPT | Mod: 26

## 2022-11-21 PROCEDURE — 99284 EMERGENCY DEPT VISIT MOD MDM: CPT

## 2022-11-21 NOTE — ED PROVIDER NOTE - NS ED ROS FT
As follows:   CONST: No fever, chills or bodyaches  EYES: No pain, redness, drainage or visual changes.  ENT: + Sneezing and feeling of popped ears as per pt. No ear discharge or congestion. No sore throat  CARD: No chest pain, palpitations  RESP: + Cough. No SOB, hemoptysis. No hx of asthma or COPD  GI: No abdominal pain, N/V/D

## 2022-11-21 NOTE — ED PROVIDER NOTE - OBJECTIVE STATEMENT
Pt is a 60 y/o female with HTN, HLD, and history of fluid retention presents for cough since Friday. Pt saw an urgent care PA and was given Azithromycin without resolution of symptoms. She had an appointment for CXR later today but came to ED instead due to persistent symptoms of nonproductive cough, sneezing, and the feeling of popped ears that concerned her after sneezing this morning. She denies any fever, chills, sore throat, N/V/D/C, CP, abdominal pain, or urinary complaints.   She was vaccinated for COVID with 4 doses and received this years flu vaccination. Pt stated COVID test she took on Friday was neg.

## 2022-11-21 NOTE — ED PROVIDER NOTE - NSFOLLOWUPINSTRUCTIONS_ED_ALL_ED_FT
Viral Syndrome    WHAT YOU NEED TO KNOW:    Viral syndrome is a term used for symptoms of an infection caused by a virus. Viruses are spread easily from person to person through the air and on shared items. An illness caused by a virus usually goes away in 10 to 14 days without treatment. Antibiotics are not given for a viral infection.     DISCHARGE INSTRUCTIONS:    Call 911 for the following:     You have a seizure.       You cannot be woken.       You have chest pain or trouble breathing.     Seek care immediately if:     You have a stiff neck, a bad headache, and sensitivity to light.       You feel weak, dizzy, or confused.       You stop urinating or urinate a lot less than normal.       You cough up blood or thick, yellow or green, mucus.       You have severe abdominal pain or your abdomen is larger than usual.     Contact your healthcare provider if:     Your symptoms do not get better with treatment, or get worse, after 3 days.       You have a rash or ear pain.       You have burning when you urinate.       You have questions or concerns about your condition or care.    Medicines: You may need any of the following:     Acetaminophen decreases pain and fever. It is available without a doctor's order. Ask how much medicine to take and how often to take it. Follow directions. Acetaminophen can cause liver damage if not taken correctly.       NSAIDs, such as ibuprofen, help decrease swelling, pain, and fever. NSAIDs can cause stomach bleeding or kidney problems in certain people. If you take blood thinner medicine, always ask your healthcare provider if NSAIDs are safe for you. Always read the medicine label and follow directions.      Cold medicine helps decrease swelling, control a cough, and relieve chest or nasal congestion.       Saline nasal spray helps decrease nasal congestion.       Take your medicine as directed. Contact your healthcare provider if you think your medicine is not helping or if you have side effects. Tell him of her if you are allergic to any medicine. Keep a list of the medicines, vitamins, and herbs you take. Include the amounts, and when and why you take them. Bring the list or the pill bottles to follow-up visits. Carry your medicine list with you in case of an emergency.    Manage your symptoms:     Drink liquids as directed to prevent dehydration. Ask how much liquid to drink each day and which liquids are best for you. Ask if you should drink an oral rehydration solution (ORS). An ORS has the right amounts of water, salts, and sugar you need to replace body fluids. This may help prevent dehydration caused by vomiting or diarrhea. Do not drink liquids with caffeine. Drinks with caffeine can make dehydration worse.       Get plenty of rest to help your body heal. Take naps throughout the day. Ask your healthcare provider when you can return to work and your normal activities.       Use a cool mist humidifier to help you breathe easier if you have nasal or chest congestion. Ask your healthcare provider how to use a cool mist humidifier.       Eat honey or use cough drops to help decrease throat discomfort. Ask your healthcare provider how much honey you should eat each day. Cough drops are available without a doctor's order. Follow directions for taking cough drops.       Do not smoke and stay away from others who smoke. Nicotine and other chemicals in cigarettes and cigars can cause lung damage. Smoking can also delay healing. Ask your healthcare provider for information if you currently smoke and need help to quit. E-cigarettes or smokeless tobacco still contain nicotine. Talk to your healthcare provider before you use these products.       Wash your hands frequently to prevent the spread of germs to others. Use soap and water. Use gel hand  when soap and water are not available. Wash your hands after you use the bathroom, cough, or sneeze. Wash your hands before you prepare or eat food.     Follow up with your healthcare provider as directed: Write down your questions so you remember to ask them during your visits.

## 2022-11-21 NOTE — ED PROVIDER NOTE - CLINICAL SUMMARY MEDICAL DECISION MAKING FREE TEXT BOX
60 yo woman w/ HTN here w/ cough X 4 days. Seen on Friday and given z-pack and scheduled for XR today. States no improvement in cough. no fevers. no chest pain, sob, monge, palpitations  States w/ cough had pain in b/l ears but that has improved  no change in hearing    COVID vax X 4 (last 4 months prior)  Flu vax this year    no recent travel  no increased leg swelling (chronic swelling only)    wd/wn  nad  rrr HR = 90  cta b/l  nt/nd + Bs  R TM: no perf, small amount of blood  L TM: erythema but no bluging  neuro intact  gait stable    60 yo woman w/ cough X 4 days. Likely viral etiology  CXR, reassess

## 2022-11-21 NOTE — ED ADULT NURSE NOTE - OBJECTIVE STATEMENT
pt came in c/o persistent cough and earache for the past days. pt states she went to urgent care and was given medication and PMD also gave her medication. did not see any relieve.

## 2022-11-21 NOTE — ED PROVIDER NOTE - PHYSICAL EXAMINATION
As Follows:  CONST: Well appearing in NAD  EYES: EOMI, Sclera and conjunctiva clear. Vision 20/20  ENT: No nasal discharge. TM's clear B/L without drainage or perforation. Oropharynx mildly erythematous without exudates. Uvula midline.   CARD: Normal S1 S2; Normal rate and rhythm  RESP: Equal BS B/L, No wheezes, rhonchi or rales. No distress  GI: Soft, non-tender, non-distended.  SKIN: Warm, dry, no acute rashes. Good turgor

## 2022-11-21 NOTE — ED ADULT NURSE NOTE - NSFALLRSKOUTCOME_ED_ALL_ED
Closing pocket- 2-0 Vicryl, 3-0 V-loc, skin approximated with staples  Primaseal placed over incision  Pressure  Bandage applied  Arm immobilized, Universal Safety Interventions

## 2022-11-21 NOTE — ED ADULT TRIAGE NOTE - BSA (M2)
Activity  â¢ For the rest of the day, do NOT:  â¢ work,   â¢ stay alone,   â¢ drive a car,   â¢ operate electrical/power tools or appliances,   â¢ drink alcohol,   â¢ sign any legal papers. â¢ Activity as tolerated today. â¢ Resume your pre-procedure activity or restrictions tomorrow. Dressing Care  â¢ Keep injection site clean and dry. â¢ You may use an ice pack on and off over the injection site for the next 24 hours while awake. Bathing  â¢ You may shower tomorrow and bathe in two days. .    Diet  â¢ Resume your pre-procedure diet. Medication  â¢ Continue home medications, unless otherwise instructed. Follow Up  â¢ Your next visit will be a follow up with Ludmila at the Pain clinic in 12/4/17. Call Dr. Jia Sahu office at [phone] if you have the following:  â¢ Drainage, increase in redness and/or swelling from the injection site. â¢ Temperature above 101 degrees. â¢ Numbness or weakness of your legs or arms, different than before the injection. â¢ Severe headache.
2.15

## 2022-11-21 NOTE — ED PROVIDER NOTE - PATIENT PORTAL LINK FT
You can access the FollowMyHealth Patient Portal offered by [facility] by registering at the following website: [email or web address] By joining [facility]’s FollowMyHealth portal, you will also be able to view your health information using other applications (apps) compatible with our system.

## 2022-11-21 NOTE — ED PROVIDER NOTE - PROGRESS NOTE DETAILS
KA: CXR negative. Will reassess and dc for continued outpatient PCP follow up. I agree with evaluation and treatment of this patient with PA Israel.

## 2023-01-10 PROBLEM — I10 ESSENTIAL (PRIMARY) HYPERTENSION: Chronic | Status: ACTIVE | Noted: 2022-11-21

## 2023-01-10 PROBLEM — R60.9 EDEMA, UNSPECIFIED: Chronic | Status: ACTIVE | Noted: 2022-11-21

## 2023-01-10 PROBLEM — E78.5 HYPERLIPIDEMIA, UNSPECIFIED: Chronic | Status: ACTIVE | Noted: 2022-11-21

## 2023-01-24 ENCOUNTER — OUTPATIENT (OUTPATIENT)
Dept: OUTPATIENT SERVICES | Facility: HOSPITAL | Age: 63
LOS: 1 days | Discharge: HOME | End: 2023-01-24
Payer: COMMERCIAL

## 2023-01-24 ENCOUNTER — TRANSCRIPTION ENCOUNTER (OUTPATIENT)
Age: 63
End: 2023-01-24

## 2023-01-24 VITALS
SYSTOLIC BLOOD PRESSURE: 168 MMHG | DIASTOLIC BLOOD PRESSURE: 81 MMHG | WEIGHT: 229.94 LBS | TEMPERATURE: 98 F | OXYGEN SATURATION: 98 % | HEART RATE: 92 BPM | HEIGHT: 64 IN | RESPIRATION RATE: 18 BRPM

## 2023-01-24 VITALS
HEART RATE: 86 BPM | OXYGEN SATURATION: 95 % | RESPIRATION RATE: 20 BRPM | DIASTOLIC BLOOD PRESSURE: 81 MMHG | SYSTOLIC BLOOD PRESSURE: 138 MMHG

## 2023-01-24 PROCEDURE — 88305 TISSUE EXAM BY PATHOLOGIST: CPT | Mod: 26

## 2023-01-24 PROCEDURE — 88312 SPECIAL STAINS GROUP 1: CPT | Mod: 26

## 2023-01-24 RX ORDER — TRAMADOL HYDROCHLORIDE 50 MG/1
1 TABLET ORAL
Qty: 0 | Refills: 0 | DISCHARGE

## 2023-01-24 RX ORDER — ZOLPIDEM TARTRATE 10 MG/1
1 TABLET ORAL
Qty: 0 | Refills: 0 | DISCHARGE

## 2023-01-24 RX ORDER — SPIRONOLACTONE 25 MG/1
1 TABLET, FILM COATED ORAL
Qty: 0 | Refills: 0 | DISCHARGE

## 2023-01-24 RX ORDER — ATORVASTATIN CALCIUM 80 MG/1
1 TABLET, FILM COATED ORAL
Qty: 0 | Refills: 0 | DISCHARGE

## 2023-01-24 RX ORDER — METOPROLOL TARTRATE 50 MG
1 TABLET ORAL
Qty: 0 | Refills: 0 | DISCHARGE

## 2023-01-24 RX ORDER — FAMOTIDINE 10 MG/ML
1 INJECTION INTRAVENOUS
Qty: 0 | Refills: 0 | DISCHARGE

## 2023-01-24 NOTE — PRE-ANESTHESIA EVALUATION ADULT - NSRADCARDRESULTSFT_GEN_ALL_CORE
ECHO:  Summary:   1. Left ventricular ejection fraction, by visual estimation, is 60 to   65%.   2. Normal left ventricular size and wall thicknesses, with normal   systolic and diastolic function.   3. Mild mitral regurgitation.   4. Mild dilatation of the ascending aorta (4.0 cm).    PHYSICIAN INTERPRETATION:  Left Ventricle: Normal left ventricular size and wall thicknesses, with   normal systolic and diastolic function. Left ventricular ejection   fraction, by visual estimation, is 60 to 65%.  Right Ventricle: Normal right ventricular size and function.  Left Atrium: Normal left atrial size.  Right Atrium: Normal right atrial size.  Pericardium: There is no evidence of pericardial effusion.  Mitral Valve: Structurally normal mitral valve with normal leaflet   excursion. No evidence of mitral stenosis. Mild mitral regurgitation.  Tricuspid Valve: Structurally normal tricuspid valve with normal leaflet   excursion. Trivial tricuspid regurgitation visualized.  Aortic Valve: Trileaflet aortic valve with normal opening. No evidence of   aortic stenosis. No aortic regurgitation.  Pulmonic Valve: No pulmonic regurgitation.  Aorta: The aortic root is normal in size. There is dilatation of the   ascending aorta.  Venous: The inferior vena cava size is normal.    CXR: (-)

## 2023-01-24 NOTE — ASU DISCHARGE PLAN (ADULT/PEDIATRIC) - NS MD DC FALL RISK RISK
For information on Fall & Injury Prevention, visit: [email or web address] OR  [email or web address] OR  https://www.cdc.gov/steadi/patient.html

## 2023-01-24 NOTE — PRE-ANESTHESIA EVALUATION ADULT - NSANTHOSAYNRD_GEN_A_CORE
No. LAVINIA screening performed.  STOP BANG Legend: 0-2 = LOW Risk; 3-4 = INTERMEDIATE Risk; 5-8 = HIGH Risk

## 2023-01-24 NOTE — ASU DISCHARGE PLAN (ADULT/PEDIATRIC) - CARE PROVIDER_API CALL
Negative
John Tiwari)  Gastroenterology; Internal Medicine  [address] [place] [address]  Phone: [phone]  Fax: [phone]  Established Patient  Follow Up Time: Routine

## 2023-01-27 LAB — SURGICAL PATHOLOGY STUDY: SIGNIFICANT CHANGE UP

## 2023-01-31 DIAGNOSIS — E66.9 OBESITY, UNSPECIFIED: ICD-10-CM

## 2023-01-31 DIAGNOSIS — K29.50 UNSPECIFIED CHRONIC GASTRITIS WITHOUT BLEEDING: ICD-10-CM

## 2023-01-31 DIAGNOSIS — Z88.2 ALLERGY STATUS TO SULFONAMIDES: ICD-10-CM

## 2023-01-31 DIAGNOSIS — E78.5 HYPERLIPIDEMIA, UNSPECIFIED: ICD-10-CM

## 2023-01-31 DIAGNOSIS — G89.29 OTHER CHRONIC PAIN: ICD-10-CM

## 2023-01-31 DIAGNOSIS — Z88.1 ALLERGY STATUS TO OTHER ANTIBIOTIC AGENTS STATUS: ICD-10-CM

## 2023-01-31 DIAGNOSIS — I10 ESSENTIAL (PRIMARY) HYPERTENSION: ICD-10-CM

## 2023-01-31 DIAGNOSIS — R13.10 DYSPHAGIA, UNSPECIFIED: ICD-10-CM

## 2023-07-14 ENCOUNTER — EMERGENCY (EMERGENCY)
Facility: HOSPITAL | Age: 63
LOS: 0 days | Discharge: HOME | End: 2023-07-14
Attending: STUDENT IN AN ORGANIZED HEALTH CARE EDUCATION/TRAINING PROGRAM
Payer: COMMERCIAL

## 2023-07-14 VITALS
RESPIRATION RATE: 18 BRPM | SYSTOLIC BLOOD PRESSURE: 193 MMHG | DIASTOLIC BLOOD PRESSURE: 100 MMHG | HEART RATE: 80 BPM | OXYGEN SATURATION: 97 % | TEMPERATURE: 98 F | WEIGHT: 240.08 LBS

## 2023-07-14 VITALS
OXYGEN SATURATION: 97 % | HEART RATE: 78 BPM | SYSTOLIC BLOOD PRESSURE: 120 MMHG | RESPIRATION RATE: 18 BRPM | DIASTOLIC BLOOD PRESSURE: 79 MMHG | TEMPERATURE: 98 F

## 2023-07-14 DIAGNOSIS — Z90.710 ACQUIRED ABSENCE OF BOTH CERVIX AND UTERUS: ICD-10-CM

## 2023-07-14 DIAGNOSIS — Z85.43 PERSONAL HISTORY OF MALIGNANT NEOPLASM OF OVARY: ICD-10-CM

## 2023-07-14 DIAGNOSIS — R60.0 LOCALIZED EDEMA: ICD-10-CM

## 2023-07-14 DIAGNOSIS — I87.8 OTHER SPECIFIED DISORDERS OF VEINS: ICD-10-CM

## 2023-07-14 DIAGNOSIS — L30.9 DERMATITIS, UNSPECIFIED: ICD-10-CM

## 2023-07-14 DIAGNOSIS — I45.10 UNSPECIFIED RIGHT BUNDLE-BRANCH BLOCK: ICD-10-CM

## 2023-07-14 DIAGNOSIS — E78.5 HYPERLIPIDEMIA, UNSPECIFIED: ICD-10-CM

## 2023-07-14 DIAGNOSIS — Z87.19 PERSONAL HISTORY OF OTHER DISEASES OF THE DIGESTIVE SYSTEM: ICD-10-CM

## 2023-07-14 DIAGNOSIS — Z88.8 ALLERGY STATUS TO OTHER DRUGS, MEDICAMENTS AND BIOLOGICAL SUBSTANCES: ICD-10-CM

## 2023-07-14 DIAGNOSIS — I10 ESSENTIAL (PRIMARY) HYPERTENSION: ICD-10-CM

## 2023-07-14 DIAGNOSIS — Z88.5 ALLERGY STATUS TO NARCOTIC AGENT: ICD-10-CM

## 2023-07-14 LAB
ALBUMIN SERPL ELPH-MCNC: 4.5 G/DL — SIGNIFICANT CHANGE UP (ref 3.5–5.2)
ALP SERPL-CCNC: 131 U/L — HIGH (ref 30–115)
ALT FLD-CCNC: 17 U/L — SIGNIFICANT CHANGE UP (ref 0–41)
ANION GAP SERPL CALC-SCNC: 11 MMOL/L — SIGNIFICANT CHANGE UP (ref 7–14)
AST SERPL-CCNC: 14 U/L — SIGNIFICANT CHANGE UP (ref 0–41)
BASOPHILS # BLD AUTO: 0.04 K/UL — SIGNIFICANT CHANGE UP (ref 0–0.2)
BASOPHILS NFR BLD AUTO: 0.5 % — SIGNIFICANT CHANGE UP (ref 0–1)
BILIRUB SERPL-MCNC: 0.7 MG/DL — SIGNIFICANT CHANGE UP (ref 0.2–1.2)
BUN SERPL-MCNC: 20 MG/DL — SIGNIFICANT CHANGE UP (ref 10–20)
CALCIUM SERPL-MCNC: 9.6 MG/DL — SIGNIFICANT CHANGE UP (ref 8.4–10.5)
CHLORIDE SERPL-SCNC: 103 MMOL/L — SIGNIFICANT CHANGE UP (ref 98–110)
CO2 SERPL-SCNC: 27 MMOL/L — SIGNIFICANT CHANGE UP (ref 17–32)
CREAT SERPL-MCNC: 0.8 MG/DL — SIGNIFICANT CHANGE UP (ref 0.7–1.5)
EGFR: 82 ML/MIN/1.73M2 — SIGNIFICANT CHANGE UP
EOSINOPHIL # BLD AUTO: 0.19 K/UL — SIGNIFICANT CHANGE UP (ref 0–0.7)
EOSINOPHIL NFR BLD AUTO: 2.6 % — SIGNIFICANT CHANGE UP (ref 0–8)
GLUCOSE SERPL-MCNC: 90 MG/DL — SIGNIFICANT CHANGE UP (ref 70–99)
HCT VFR BLD CALC: 43.5 % — SIGNIFICANT CHANGE UP (ref 37–47)
HGB BLD-MCNC: 14.7 G/DL — SIGNIFICANT CHANGE UP (ref 12–16)
IMM GRANULOCYTES NFR BLD AUTO: 0.3 % — SIGNIFICANT CHANGE UP (ref 0.1–0.3)
LYMPHOCYTES # BLD AUTO: 1.72 K/UL — SIGNIFICANT CHANGE UP (ref 1.2–3.4)
LYMPHOCYTES # BLD AUTO: 23.3 % — SIGNIFICANT CHANGE UP (ref 20.5–51.1)
MCHC RBC-ENTMCNC: 29.6 PG — SIGNIFICANT CHANGE UP (ref 27–31)
MCHC RBC-ENTMCNC: 33.8 G/DL — SIGNIFICANT CHANGE UP (ref 32–37)
MCV RBC AUTO: 87.7 FL — SIGNIFICANT CHANGE UP (ref 81–99)
MONOCYTES # BLD AUTO: 0.48 K/UL — SIGNIFICANT CHANGE UP (ref 0.1–0.6)
MONOCYTES NFR BLD AUTO: 6.5 % — SIGNIFICANT CHANGE UP (ref 1.7–9.3)
NEUTROPHILS # BLD AUTO: 4.93 K/UL — SIGNIFICANT CHANGE UP (ref 1.4–6.5)
NEUTROPHILS NFR BLD AUTO: 66.8 % — SIGNIFICANT CHANGE UP (ref 42.2–75.2)
NRBC # BLD: 0 /100 WBCS — SIGNIFICANT CHANGE UP (ref 0–0)
NT-PROBNP SERPL-SCNC: 29 PG/ML — SIGNIFICANT CHANGE UP (ref 0–300)
PLATELET # BLD AUTO: 217 K/UL — SIGNIFICANT CHANGE UP (ref 130–400)
PMV BLD: 9.9 FL — SIGNIFICANT CHANGE UP (ref 7.4–10.4)
POTASSIUM SERPL-MCNC: 4.3 MMOL/L — SIGNIFICANT CHANGE UP (ref 3.5–5)
POTASSIUM SERPL-SCNC: 4.3 MMOL/L — SIGNIFICANT CHANGE UP (ref 3.5–5)
PROT SERPL-MCNC: 6.7 G/DL — SIGNIFICANT CHANGE UP (ref 6–8)
RBC # BLD: 4.96 M/UL — SIGNIFICANT CHANGE UP (ref 4.2–5.4)
RBC # FLD: 14.3 % — SIGNIFICANT CHANGE UP (ref 11.5–14.5)
SODIUM SERPL-SCNC: 141 MMOL/L — SIGNIFICANT CHANGE UP (ref 135–146)
TROPONIN T SERPL-MCNC: <0.01 NG/ML — SIGNIFICANT CHANGE UP
WBC # BLD: 7.38 K/UL — SIGNIFICANT CHANGE UP (ref 4.8–10.8)
WBC # FLD AUTO: 7.38 K/UL — SIGNIFICANT CHANGE UP (ref 4.8–10.8)

## 2023-07-14 PROCEDURE — 93005 ELECTROCARDIOGRAM TRACING: CPT

## 2023-07-14 PROCEDURE — 36415 COLL VENOUS BLD VENIPUNCTURE: CPT

## 2023-07-14 PROCEDURE — 83880 ASSAY OF NATRIURETIC PEPTIDE: CPT

## 2023-07-14 PROCEDURE — 85025 COMPLETE CBC W/AUTO DIFF WBC: CPT

## 2023-07-14 PROCEDURE — 80053 COMPREHEN METABOLIC PANEL: CPT

## 2023-07-14 PROCEDURE — 93970 EXTREMITY STUDY: CPT | Mod: 26

## 2023-07-14 PROCEDURE — 93010 ELECTROCARDIOGRAM REPORT: CPT

## 2023-07-14 PROCEDURE — 71046 X-RAY EXAM CHEST 2 VIEWS: CPT

## 2023-07-14 PROCEDURE — 93970 EXTREMITY STUDY: CPT

## 2023-07-14 PROCEDURE — 99285 EMERGENCY DEPT VISIT HI MDM: CPT

## 2023-07-14 PROCEDURE — 84484 ASSAY OF TROPONIN QUANT: CPT

## 2023-07-14 PROCEDURE — 96374 THER/PROPH/DIAG INJ IV PUSH: CPT

## 2023-07-14 PROCEDURE — 71046 X-RAY EXAM CHEST 2 VIEWS: CPT | Mod: 26

## 2023-07-14 PROCEDURE — 99285 EMERGENCY DEPT VISIT HI MDM: CPT | Mod: 25

## 2023-07-14 RX ORDER — CEPHALEXIN 500 MG
1 CAPSULE ORAL
Qty: 15 | Refills: 0
Start: 2023-07-14 | End: 2023-07-18

## 2023-07-14 RX ORDER — AMPICILLIN SODIUM AND SULBACTAM SODIUM 250; 125 MG/ML; MG/ML
3 INJECTION, POWDER, FOR SUSPENSION INTRAMUSCULAR; INTRAVENOUS ONCE
Refills: 0 | Status: COMPLETED | OUTPATIENT
Start: 2023-07-14 | End: 2023-07-14

## 2023-07-14 RX ORDER — ETHACRYNIC ACID 25 MG/1
50 TABLET ORAL ONCE
Refills: 0 | Status: COMPLETED | OUTPATIENT
Start: 2023-07-14 | End: 2023-07-14

## 2023-07-14 RX ADMIN — ETHACRYNIC ACID 50 MILLIGRAM(S): 25 TABLET ORAL at 16:57

## 2023-07-14 RX ADMIN — AMPICILLIN SODIUM AND SULBACTAM SODIUM 200 GRAM(S): 250; 125 INJECTION, POWDER, FOR SUSPENSION INTRAMUSCULAR; INTRAVENOUS at 16:41

## 2023-07-14 NOTE — ED PROVIDER NOTE - ATTENDING CONTRIBUTION TO CARE
I personally evaluated the patient. I reviewed the  Physician Assistant’s note and agree with the findings and plan except as documented in my note.

## 2023-07-14 NOTE — ED ADULT NURSE NOTE - NSFALLHARMRISKINTERV_ED_ALL_ED

## 2023-07-14 NOTE — ED PROVIDER NOTE - NSPTACCESSSVCSAPPTDETAILS_ED_ALL_ED_FT
Pls assist with follow up with Dr. Mcgowan in the upcoming week (he is going on vacation soon)  [phone]

## 2023-07-14 NOTE — ED PROVIDER NOTE - PHYSICAL EXAMINATION
_  Vital signs reviewed; ABCs intact  GENERAL: Well nourished, comfortable  SKIN: Warm, dry  HEAD & NECK: NCAT, supple neck  EYES: EOMI, PER B/L  ENT: MMM  CARD: RRR, S1, S2; no murmurs, no rubs, no gallops  RESP: Normal respiratory effort, CTAB, no rales, no wheezing  ABD: Soft, ND, NT, no rebound, no guarding  EXT: +B/L LE edema 2+, L edema > R edema, L calf with weeping and skin break down; venous stasis dermatitis noted  NEUROMSK: Grossly intact  PSYCH: AAOx3, cooperative, appropriate

## 2023-07-14 NOTE — ED PROVIDER NOTE - CARE PROVIDER_API CALL
Chandu Mcgowan  Vascular Surgery  [address] [place] [address]  Phone: [phone]  Fax: [phone]  Established Patient  Follow Up Time: Urgent

## 2023-07-14 NOTE — ED PROVIDER NOTE - DATE/TIME 1
[FreeTextEntry1] : Diagnosis: Bladder Cancer\par \par Plan:\par Catheter removed today\par PVR was 4 cc\par Restarted Plavix, continue with hydration\par Discussed s/s to monitor for post -op\par Contniue with Tamsulosin\par pathology reviewed, follow up with cysto in 3 months \par \par \par RTC in 3 months with cystoscopy  14-Jul-2023 16:32

## 2023-07-14 NOTE — CONSULT NOTE ADULT - ASSESSMENT
ASSESSMENT: Patient is 62F w/ hx of HTN, Ovarian cancer s/p total hysterectomy in remission, and chronic lower extremity edema. Vascular surgery consulted for bilateral lower extremity swelling and weeping. Duplex was negative for DVT. EKG no acute infarct, CXR no acute findings, BNP and troponin unremarkable. Was given a 1 time dose of ethacrynic acid, keflex PO and unasyn IV in the ED.     PLAN:   - discharge home on Keflex P.O most likely venous insufficiency superimposed with mild cellulitis.   - no acute need for vascular surgical intervention at this time   - Follow up/make outpatient appt with Dr Mcgowan within one week   - Patient seen/examined or Plan Discussed with Fellow, Dr. Meehan   - Plan to be discussed with Attending, Dr. Roslyn Chou MD  General Surgery PGY-1     SPECTRA [id number]

## 2023-07-14 NOTE — CONSULT NOTE ADULT - SUBJECTIVE AND OBJECTIVE BOX
VASCULAR SURGERY CONSULT NOTE      HPI: 62F w/ hx of HTN, Ovarian cancer s/p total hysterectomy in remission, and chronic lower extremity edema presented to the ED with worsening bilateral swelling and weeping of scab wounds of lower legs in the last 2 days, which prompted her to seek medical attention. She states she was previously on lasix in the past for chronic leg swelling, but it was discontinued when she developed presumed diuretic-induced pancreatitis. Her physician at that time switched her to taking spironolactone. At baseline, she uses a cane to help her ambulate.   she denies SOB, chest pain.         PAST MEDICAL & SURGICAL HISTORY:  Hypertension      Hyperlipidemia      Fluid retention      No significant past surgical history        Erythro-Rx (Rash)  codeine (Unknown)  Sulfur Colliod (Rash)    Home Medications:  atorvastatin 20 mg oral tablet: 1 tab(s) orally once a day (24 Jan 2023 15:23)  famotidine 40 mg oral tablet: 1 tab(s) orally 2 times a day (24 Jan 2023 15:23)  metoprolol succinate 50 mg oral tablet, extended release: 1 tab(s) orally once a day (24 Jan 2023 15:23)  spironolactone 25 mg oral tablet: 1 tab(s) orally once a day (24 Jan 2023 15:23)  traMADol 50 mg oral tablet: 1 tab(s) orally every 6 hours, As Needed (24 Jan 2023 15:23)  zolpidem 5 mg oral tablet: 1 tab(s) orally once a day (at bedtime), As Needed (24 Jan 2023 15:23)    No permtinent family history of PVD    REVIEW OF SYSTEMS:  GENERAL:                                         negative  SKIN:                                                 negative  OPTHALMOLOGIC:                          negative  ENMT:                                               negative  RESPIRATORY AND THORAX:        negative  CARDIOVASCULAR:                         negative  GASTROINTESTINAL:                       negative  NEPHROLOGY:                                  negative  MUSCULOSKELETAL:                       negative  NEUROLOGIC:                                   negative  PSYCHIATRIC:                                    negative  HEMATOLOGY/LYMPHATICS:         negative  ENDOCRINE:                                     negative  ALLERGIC/IMMUNOLOGIC:            negative    12 point ROS otherwise normal except as stated in HPI    PHYSICAL EXAM  Vital Signs Last 24 Hrs  T(C): 36.7 (14 Jul 2023 16:43), Max: 36.7 (14 Jul 2023 11:44)  T(F): 98 (14 Jul 2023 16:43), Max: 98 (14 Jul 2023 11:44)  HR: 78 (14 Jul 2023 16:43) (78 - 80)  BP: 120/79 (14 Jul 2023 16:43) (120/79 - 193/100)  BP(mean): 95 (14 Jul 2023 16:43) (95 - 95)  RR: 18 (14 Jul 2023 16:43) (18 - 18)  SpO2: 97% (14 Jul 2023 16:43) (97% - 97%)    Parameters below as of 14 Jul 2023 16:43  Patient On (Oxygen Delivery Method): room air        Appearance: pleasant in no acute distress.   Cardiovascular: Normal S1 S2, No JVD, No murmurs,   Respiratory: Lungs clear to auscultation, No Rales, Rhonchi, Wheezing. no increased work of breathing. 	  Gastrointestinal:  Soft, NTND  Skin: areas of hyperpigmented brown discoloration with erythema to mid calf. several noticeable weeping scabs with yellow fluid draining more on the left leg than the right. increased warmth to touch left calf more than right with coolness of feet bilaterally.   Extremities: 1-2+ pitting edema to below the knee bilaterally. non tender, Normal range of motion, No clubbing, cyanosis.   Vascular: left DP/PT dopplerable pulses. left DP/PT negative for palpable pulses due to edema. right DP/PT are 2+ palpable. radial and popliteal palpable pulses 2+ bilaterally.    Neurologic: Non-focal  Psychiatry: A & O x 3, Mood & affect appropriate      PULSES:  Femoral:2+  Popliteal:2+  Dorsal Pedal: 2+ pulses   Posterior Tibial: 2+ pulses  Capillary: 2 sec    MEDICATIONS:   MEDICATIONS  (STANDING):    MEDICATIONS  (PRN):      LAB/STUDIES:                        14.7   7.38  )-----------( 217      ( 14 Jul 2023 12:38 )             43.5     07-14    141  |  103  |  20  ----------------------------<  90  4.3   |  27  |  0.8    Ca    9.6      14 Jul 2023 12:38    TPro  6.7  /  Alb  4.5  /  TBili  0.7  /  DBili  x   /  AST  14  /  ALT  17  /  AlkPhos  131<H>  07-14      LIVER FUNCTIONS - ( 14 Jul 2023 12:38 )  Alb: 4.5 g/dL / Pro: 6.7 g/dL / ALK PHOS: 131 U/L / ALT: 17 U/L / AST: 14 U/L / GGT: x             Urinalysis Basic - ( 14 Jul 2023 12:38 )    Color: x / Appearance: x / SG: x / pH: x  Gluc: 90 mg/dL / Ketone: x  / Bili: x / Urobili: x   Blood: x / Protein: x / Nitrite: x   Leuk Esterase: x / RBC: x / WBC x   Sq Epi: x / Non Sq Epi: x / Bacteria: x      CARDIAC MARKERS ( 14 Jul 2023 12:38 )  x     / <0.01 ng/mL / x     / x     / x            IMAGING:  < from: [facility] Duplex Lower Ext Vein Scan, Bilat (07.14.23 @ 14:52) >  No evidence of deep venous thrombosis in either lower extremity.  Bilateral peroneal veins are not visualized    < from: Xray Chest 2 Views PA/Lat (07.14.23 @ 12:54) >  No radiographic evidence of acute cardiopulmonary disease.    < from: 12 Lead ECG (07.14.23 @ 14:33) >  Diagnosis Line Normal sinus rhythm  Left axis deviation  Incomplete right bundle branch block  Anterior infarct , age undetermined                      
17-Jun-2022 10:51

## 2023-07-14 NOTE — ED PROVIDER NOTE - PATIENT PORTAL LINK FT
You can access the FollowMyHealth Patient Portal offered by [facility] by registering at the following website: [email or web address]. By joining [employer]’s FollowMyHealth portal, you will also be able to view your health information using other applications (apps) compatible with our system.

## 2023-07-14 NOTE — ED PROVIDER NOTE - CLINICAL SUMMARY MEDICAL DECISION MAKING FREE TEXT BOX
TA: Pt presenting for R>L lower extremity edema. Pt signed out to me pending vascular eval/consult. Vascular - negative for DVT. Patient evaluated by vascular who recommend d/c with outpatient follow up with Dr. Mcgowan. Patient to be discharged from ED. Any available test results were discussed with patient and/or family. Verbal instructions given, including instructions to return to ED immediately for any new, worsening, or concerning symptoms. Patient endorsed understanding. Written discharge instructions additionally given, including follow-up plan.

## 2023-07-14 NOTE — ED PROVIDER NOTE - PROGRESS NOTE DETAILS
Resident AO: Duplex of lower extremities negative for DVT.  Vascular surgery consulted for recommendations given that patient follows up with Dr. Plummer outpatient. Resident AO: Initially planned for admission to Medicine for possible diuresis given LE edema (likely secondary to venous insufficiency), but Medicine requested recommendations from Vascular.   Vascular surgery consulted for recommendations given that patient follows up with Dr. Plummer outpatient (has been on Aldactone alone, and using compression stockings, however, unclear if fully compliant). Resident AO: Pending Vascular surgery recommendations, and ultimate disposition. Resident AO: Patient ordered ethacrynic acid for diuresis, given that unable to give Lasix or Bumex (had pancreatitis in the past).     Initially planned for admission to Medicine for possible diuresis given LE edema (likely secondary to venous insufficiency), but Medicine requested recommendations from Vascular.   Vascular surgery consulted for recommendations given that patient follows up with Dr. Plummer outpatient (has been on Aldactone alone, and using compression stockings, however, unclear if fully compliant). Resident AO: Pending Vascular surgery recommendations in terms of diuresis, and ultimate disposition. GINO-- pt signed out to me by Dr. Delarosa pending vascular recs.   Pt seen by vascular at bedside, who agree with plan for DC with outpt follow up with Dr. Mcgowan.   Discussed plan with pt at bedside who agrees.

## 2023-07-14 NOTE — ED PROVIDER NOTE - OBJECTIVE STATEMENT
Patient is a 62-year-old woman with a history of hypertension, hyperlipidemia, ovarian cancer status post total hysterectomy now in remission, pancreatitis several months ago presumed to be secondary to loop diuretics, chronic lower extremity edema previously on Lasix and Aldactone, presently only on Aldactone, never smoker.  Patient is presenting for bilateral lower extremity edema left < right, worsening over the past several weeks, with development of seeping fluid over the past 2 days.  No chest pain.  No orthopnea, dyspnea on exertion or at rest. No fever. Patient is a 62-year-old woman with a history of hypertension, hyperlipidemia, ovarian cancer status post total hysterectomy now in remission, pancreatitis several months ago presumed to be secondary to loop diuretics, chronic lower extremity edema previously on Lasix and Aldactone, presently only on Aldactone, never smoker.  Patient is presenting for bilateral lower extremity edema left < right, worsening over the past several weeks, with development of seeping fluid over the past 2 days.  No chest pain.  No orthopnea, dyspnea on exertion or at rest. No fever. No other complaints - no abd pain, NVD, dysuria.

## 2023-07-14 NOTE — ED PROVIDER NOTE - NSFOLLOWUPINSTRUCTIONS_ED_ALL_ED_FT
You were evaluated in the Emergency Department today, and your visit did not reveal anything immediately life-threatening.    However, it is important that you follow-up with your PRIMARY CARE PHYSICIAN within 3-5 days.    Additionally, it is important that you follow-up with VASCULAR SURGERY (DR PARSON) as scheduled.   ------------------------------------------------------------------------------------------------------------------------  Most likely, the swelling of your legs is from venous insufficiency (floppy/leaky valves) in the legs. This is often associated with skin discoloration. This is typically treated with compression stockings, diuretics ("water pills" to help you urine extra fluid), and elevation (raising legs up above heart level).     In case there is a component of an infection of the skin (cellulitis), antibiotics were sent to your pharmacy.  ------------------------------------------------------------------------------------------------------------------------  Leg Edema    WHAT YOU NEED TO KNOW:    Leg edema is swelling caused by fluid buildup. Your legs may swell if you sit or stand for long periods of time, are pregnant, or are injured. Swelling may also occur if you have heart failure or circulation problems. This means that your heart does not pump blood through your body as it should.      DISCHARGE INSTRUCTIONS:    Call your local emergency number (651 in the [place]) for any of the following:   - You cannot walk.  - You have chest pain or trouble breathing that is worse when you lie down.  - You suddenly feel lightheaded and have trouble breathing.  - You have new and sudden chest pain. You may have more pain when you take deep breaths or cough.  - You cough up blood.    Return to the emergency department if:   - You feel faint or confused.  - Your skin turns blue or gray.  - Your leg feels warm, tender, and painful. It may be swollen and red.    Call your doctor if:   - You have a fever or feel more tired than usual.  - The veins in your legs look larger than usual. They may look full or bulging.  - Your legs itch or feel heavy.  - You have red or white areas or sores on your legs. The skin may also appear dimpled or have indentations.  - You are gaining weight.  - You have trouble moving your ankles.  - The swelling does not go away, or other parts of your body swell.  - You have questions or concerns about your condition or care.      Self-care:     - Elevate your legs. Raise your legs above the level of your heart as often as you can. This will help decrease swelling and pain. Prop your legs on pillows or blankets to keep them elevated comfortably.     - Wear pressure stockings, if directed. These tight stockings put pressure on your legs to promote blood flow and prevent blood clots. Put them on before you get out of bed. Wear the stockings during the day. Do not wear them while you sleep.    - Stay active. Do not stand or sit for long periods of time. Ask your healthcare provider about the best exercise plan for you.    - Eat healthy foods. Healthy foods include fruits, vegetables, whole-grain breads, low-fat dairy products, beans, lean meats, and fish. Ask if you need to be on a special diet.  Healthy Foods     - Limit sodium (salt). Salt will make your body hold even more fluid. Your healthcare provider will tell you how many milligrams (mg) of salt you can have each day.      Follow up with your doctor as directed: Write down your questions so you remember to ask them during your visits.

## 2023-07-14 NOTE — ED ADULT TRIAGE NOTE - CHIEF COMPLAINT QUOTE
Patient presents to ED c/o bilateral lower extremity swelling, worse in left leg. Patient also noted with redness and blisters

## 2024-02-03 NOTE — PRE-ANESTHESIA EVALUATION ADULT - NSANTHPMHFT_GEN_ALL_CORE
Breath sounds clear and equal bilaterally. PMHx: Class III Morbid Obesity, Pancreatitis, Chronic Pain  Denies f/c, n/v, SOB/CP, or recent URI.  <4 METS.  Ambulates w/ cane on occasion.

## 2024-02-17 ENCOUNTER — EMERGENCY (EMERGENCY)
Facility: HOSPITAL | Age: 64
LOS: 0 days | Discharge: HOME | End: 2024-02-17
Attending: STUDENT IN AN ORGANIZED HEALTH CARE EDUCATION/TRAINING PROGRAM
Payer: MEDICARE

## 2024-02-17 VITALS
RESPIRATION RATE: 16 BRPM | WEIGHT: 220.02 LBS | HEART RATE: 91 BPM | TEMPERATURE: 98 F | OXYGEN SATURATION: 100 % | HEIGHT: 64 IN | SYSTOLIC BLOOD PRESSURE: 175 MMHG | DIASTOLIC BLOOD PRESSURE: 85 MMHG

## 2024-02-17 DIAGNOSIS — M25.512 PAIN IN LEFT SHOULDER: ICD-10-CM

## 2024-02-17 DIAGNOSIS — Z88.2 ALLERGY STATUS TO SULFONAMIDES: ICD-10-CM

## 2024-02-17 DIAGNOSIS — Z88.1 ALLERGY STATUS TO OTHER ANTIBIOTIC AGENTS STATUS: ICD-10-CM

## 2024-02-17 DIAGNOSIS — I10 ESSENTIAL (PRIMARY) HYPERTENSION: ICD-10-CM

## 2024-02-17 DIAGNOSIS — Z88.5 ALLERGY STATUS TO NARCOTIC AGENT: ICD-10-CM

## 2024-02-17 DIAGNOSIS — E78.5 HYPERLIPIDEMIA, UNSPECIFIED: ICD-10-CM

## 2024-02-17 PROCEDURE — 73030 X-RAY EXAM OF SHOULDER: CPT | Mod: LT

## 2024-02-17 PROCEDURE — 99283 EMERGENCY DEPT VISIT LOW MDM: CPT | Mod: 25

## 2024-02-17 PROCEDURE — 99284 EMERGENCY DEPT VISIT MOD MDM: CPT | Mod: FS

## 2024-02-17 PROCEDURE — 73030 X-RAY EXAM OF SHOULDER: CPT | Mod: 26,LT

## 2024-02-17 RX ORDER — IBUPROFEN 200 MG
600 TABLET ORAL ONCE
Refills: 0 | Status: COMPLETED | OUTPATIENT
Start: 2024-02-17 | End: 2024-02-17

## 2024-02-17 RX ADMIN — Medication 600 MILLIGRAM(S): at 12:17

## 2024-02-17 RX ADMIN — Medication 600 MILLIGRAM(S): at 11:50

## 2024-02-17 NOTE — ED PROVIDER NOTE - PATIENT PORTAL LINK FT
You can access the FollowMyHealth Patient Portal offered by [facility] by registering at the following website: [email or web address]. By joining [employer]’s FollowMyHealth portal, you will also be able to view your health information using other applications (apps) compatible with our system.

## 2024-02-17 NOTE — ED ADULT NURSE NOTE - NSFALLUNIVINTERV_ED_ALL_ED
Bed/Stretcher in lowest position, wheels locked, appropriate side rails in place/Call bell, personal items and telephone in reach/Instruct patient to call for assistance before getting out of bed/chair/stretcher/Non-slip footwear applied when patient is off stretcher/[place] to call system/Physically safe environment - no spills, clutter or unnecessary equipment/Purposeful proactive rounding/Room/bathroom lighting operational, light cord in reach

## 2024-02-17 NOTE — ED PROVIDER NOTE - OBJECTIVE STATEMENT
patient is a 62yo female PMH htn, hld, ovarian cancer complaining of left shoulder pain x 1 week. pt reports pain with arm/ shoulder motion. was recently started on subQ insulin, has used left shoulder as an injection site. denies inciting injury, numbness/ paresthesias, weakness.

## 2024-02-17 NOTE — ED PROVIDER NOTE - NSFOLLOWUPINSTRUCTIONS_ED_ALL_ED_FT
FOLLOW UP WITH YOUR PRIMARY DOCTOR    Shoulder Pain  Many things can cause shoulder pain, including:  An injury to the shoulder.  Overuse of the shoulder.  Arthritis.  The source of the pain can be:  Inflammation.  An injury to the shoulder joint.  An injury to a tendon, ligament, or bone.  Follow these instructions at home:  Pay attention to changes in your symptoms. Let your health care provider know about them. Follow these instructions to relieve your pain.    If you have a removable sling:    Wear the sling as told by your provider. Remove it only as told by your provider.  Check the skin around the sling every day. Tell your provider about any concerns.  Loosen the sling if your fingers tingle, become numb, or become cold.  Keep the sling clean.  If the sling is not waterproof:  Do not let it get wet.  Remove it to shower or bathe.  Move your arm as little as possible, but keep your hand moving to prevent swelling.  Managing pain, stiffness, and swelling    Bag of ice on a towel on the skin.  If told, put ice on the painful area.  If you have a removable sling or immobilizer, remove it as told by your provider.  Put ice in a plastic bag.  Place a towel between your skin and the bag.  Leave the ice on for 20 minutes, 2–3 times a day.  If your skin turns bright red, remove the ice right away to prevent skin damage. The risk of damage is higher if you cannot feel pain, heat, or cold.  Move your fingers often to reduce stiffness and swelling.  Squeeze a soft ball or a foam pad as much as possible. This helps to keep the shoulder from swelling. It also helps to strengthen the arm.  General instructions    Take over-the-counter and prescription medicines only as told by your provider.  Exercise may help with pain management. Perform exercises if told by your provider.  You may be referred to a physical therapist to help in your recovery process.  Keep all follow-up visits in order to avoid any type of permanent shoulder disability or chronic pain problems.  Contact a health care provider if:  Your pain is not relieved with medicines.  New pain develops in your arm, hand, or fingers.  You loosen your sling and your arm, hand, or fingers remain tingly, numb, swollen, or painful.  Get help right away if:  Your arm, hand, or fingers turn white or blue.  This information is not intended to replace advice given to you by your health care provider. Make sure you discuss any questions you have with your health care provider.

## 2024-02-17 NOTE — ED PROVIDER NOTE - CLINICAL SUMMARY MEDICAL DECISION MAKING FREE TEXT BOX
63-year-old presenting today with left shoulder pain.  Patient is hemodynamically stable and well-appearing on evaluation.  Patient was recently using subcu insulin in the are and the injection site started having pain. X-ray normal. Patient reported some pain relief after medications.

## 2024-02-17 NOTE — ED ADULT NURSE NOTE - OBJECTIVE STATEMENT
pt came in c/o L shoulder pain that started about a week ago denies any trauma or lifting anything with the arm.

## 2024-02-17 NOTE — ED PROVIDER NOTE - PHYSICAL EXAMINATION
CONST: Well appearing in NAD  CARD: Normal S1 S2; Normal rate and rhythm  RESP: Equal BS B/L, No wheezes, rhonchi or rales. No distress  MS: Normal ROM in b/l lower extremities and RUE. No midline spinal tenderness. decreased ROM LUE   SKIN: Warm, dry, no acute rashes. Good turgor  NEURO: A&Ox3, No focal deficits. Strength 5/5 with no sensory deficits. Steady gait

## 2024-03-26 ENCOUNTER — APPOINTMENT (OUTPATIENT)
Dept: ORTHOPEDIC SURGERY | Facility: CLINIC | Age: 64
End: 2024-03-26
Payer: MEDICARE

## 2024-03-26 DIAGNOSIS — S43.432A SUPERIOR GLENOID LABRUM LESION OF LEFT SHOULDER, INITIAL ENCOUNTER: ICD-10-CM

## 2024-03-26 PROBLEM — Z00.00 ENCOUNTER FOR PREVENTIVE HEALTH EXAMINATION: Status: ACTIVE | Noted: 2024-03-26

## 2024-03-26 PROCEDURE — 99203 OFFICE O/P NEW LOW 30 MIN: CPT

## 2024-03-26 NOTE — DISCUSSION/SUMMARY
[de-identified] : Went over MRI results with patient revealing a labrum tear, a partial tear of one of the rotator cuff tendons, and tendinitis.  I explained to patient that she can initiate physical therapy to try to get relief of symptoms and if this conservative treatment does not work surgery may be an option.  Patient was also being sent a prescription for meloxicam 15 mg to take as needed for pain and inflammation.  Patient encouraged apply heat to the area.  Patient will follow-up in approximately 6 weeks with the sports medicine doctors for further assessment and discussion of surgery if it is appropriate at that time.  Patient agrees to above plan all questions were answered today

## 2024-03-26 NOTE — DATA REVIEWED
[FreeTextEntry1] : X-ray images were reviewed from the hospital and MRI results went over in the office with patient and scanned in chart

## 2024-03-26 NOTE — PHYSICAL EXAM
[de-identified] : Physical exam of the left shoulder: -No erythema, edema, ecchymosis or acute deformities.  Skin intact -No TTP of GH joint -ROM: forward flexion: 100 degrees, external rotation: 40 degrees, internal rotation: hand reaches SI joint -Pain in all planes of motion -+2 radial pulse, sensation intact to light touch

## 2024-03-26 NOTE — HISTORY OF PRESENT ILLNESS
[de-identified] : 63-year-old female presents for left shoulder pain.  Patient noticed pain in the left shoulder for a few weeks, she subsequently went to the emergency room where x-rays were taken and then she went to her primary care provider and MRI was ordered.  Patient states the pain started atraumatically.  Patient is right-hand dominant.  Has been taking anti-inflammatories for pain relief.

## 2024-05-14 ENCOUNTER — APPOINTMENT (OUTPATIENT)
Dept: ORTHOPEDIC SURGERY | Facility: CLINIC | Age: 64
End: 2024-05-14
Payer: MEDICARE

## 2024-05-14 DIAGNOSIS — M25.512 PAIN IN LEFT SHOULDER: ICD-10-CM

## 2024-05-14 PROCEDURE — 20611 DRAIN/INJ JOINT/BURSA W/US: CPT | Mod: LT

## 2024-05-14 PROCEDURE — 99204 OFFICE O/P NEW MOD 45 MIN: CPT | Mod: 25

## 2024-05-14 RX ORDER — MELOXICAM 15 MG/1
15 TABLET ORAL
Qty: 30 | Refills: 0 | Status: ACTIVE | COMMUNITY
Start: 2024-03-26 | End: 1900-01-01

## 2024-05-14 NOTE — HISTORY OF PRESENT ILLNESS
[de-identified] : Patient is here for evaluation of left shoulder pain Affecting quality of life Has pain and weakness with loss of rom Wakes up at night due to pain  NAD Left shoulder: TTP ant GH joint, bicipital groove FF 0-140 (passive 175) ER 40 IR L5 Pain with terminal rom Weakness to abduction and ER Pos Impingement Pos [place] Pos Cross Arm Adduction Negative instability Positive scapula dyskinesia  XRay Left shoulder negative for fracture, dislocation, arthritis  mri left shoulder: rc tendinitis, pRCT, slap tear, ant inf labral tear, ac arthosis  plan went over findings explained the imaging tx options explained cont pt due to pain, left shoulder injection cont cont tx op vs nonop fu in 2 months  Large Joint Injection was performed because of pain/rom. Anesthesia: ethyl chloride sprayed topically. Dexamethasone 2 cc of 4mg.   Lidocaine: 2 cc of 1%  Medication was injected in the left shoulder. Patient has tried OTC's including aspirin, Ibuprofen, Aleve etc or prescription NSAIDS, and/or exercises at home and/ or physical therapy without satisfactory response. After verbal consent using sterile preparation and technique. The risks, benefits, and alternatives to cortisone injection were explained in full to the patient. Risks outlined include but are not limited to infection, sepsis, bleeding, scarring, skin discoloration, temporary increase in pain, syncopal episode, failure to resolve symptoms, allergic reaction, symptom recurrence, and elevation of blood sugar in diabetics. Patient understood the risks. All questions were answered. After discussion of options, patient requested an injection. Oral informed consent was obtained and sterile prep was done of the injection site. Sterile technique was utilized for the procedure including the preparation of the solutions used for the injection. Patient tolerated the procedure well. Advised to ice the injection site this evening. Prep with alcohol locally to site. Sterile technique used. Diagnostic ultrasound was performed of the shoulder to confirm.

## 2024-07-23 ENCOUNTER — APPOINTMENT (OUTPATIENT)
Dept: ORTHOPEDIC SURGERY | Facility: CLINIC | Age: 64
End: 2024-07-23
